# Patient Record
Sex: FEMALE | Race: WHITE | Employment: FULL TIME | URBAN - NONMETROPOLITAN AREA
[De-identification: names, ages, dates, MRNs, and addresses within clinical notes are randomized per-mention and may not be internally consistent; named-entity substitution may affect disease eponyms.]

---

## 2017-12-13 RX ORDER — ACETAMINOPHEN 325 MG/1
650 TABLET ORAL EVERY 4 HOURS PRN
Status: CANCELLED | OUTPATIENT
Start: 2017-12-13

## 2017-12-13 RX ORDER — SODIUM CHLORIDE 0.9 % (FLUSH) 0.9 %
10 SYRINGE (ML) INJECTION EVERY 12 HOURS SCHEDULED
Status: CANCELLED | OUTPATIENT
Start: 2017-12-13

## 2017-12-13 RX ORDER — TERBUTALINE SULFATE 1 MG/ML
0.25 INJECTION, SOLUTION SUBCUTANEOUS ONCE
Status: CANCELLED | OUTPATIENT
Start: 2017-12-13 | End: 2017-12-13

## 2017-12-13 RX ORDER — SODIUM CHLORIDE, SODIUM LACTATE, POTASSIUM CHLORIDE, CALCIUM CHLORIDE 600; 310; 30; 20 MG/100ML; MG/100ML; MG/100ML; MG/100ML
INJECTION, SOLUTION INTRAVENOUS CONTINUOUS
Status: CANCELLED | OUTPATIENT
Start: 2017-12-13

## 2017-12-13 RX ORDER — CARBOPROST TROMETHAMINE 250 UG/ML
250 INJECTION, SOLUTION INTRAMUSCULAR PRN
Status: CANCELLED | OUTPATIENT
Start: 2017-12-13

## 2017-12-13 RX ORDER — MORPHINE SULFATE 4 MG/ML
4 INJECTION, SOLUTION INTRAMUSCULAR; INTRAVENOUS
Status: CANCELLED | OUTPATIENT
Start: 2017-12-13

## 2017-12-13 RX ORDER — DIPHENHYDRAMINE HYDROCHLORIDE 50 MG/ML
25 INJECTION INTRAMUSCULAR; INTRAVENOUS EVERY 4 HOURS PRN
Status: CANCELLED | OUTPATIENT
Start: 2017-12-13

## 2017-12-13 RX ORDER — MORPHINE SULFATE 2 MG/ML
2 INJECTION, SOLUTION INTRAMUSCULAR; INTRAVENOUS
Status: CANCELLED | OUTPATIENT
Start: 2017-12-13

## 2017-12-13 RX ORDER — SODIUM CHLORIDE 0.9 % (FLUSH) 0.9 %
10 SYRINGE (ML) INJECTION PRN
Status: CANCELLED | OUTPATIENT
Start: 2017-12-13

## 2017-12-13 RX ORDER — IBUPROFEN 800 MG/1
800 TABLET ORAL EVERY 8 HOURS PRN
Status: CANCELLED | OUTPATIENT
Start: 2017-12-13

## 2017-12-13 RX ORDER — BUTORPHANOL TARTRATE 1 MG/ML
1 INJECTION, SOLUTION INTRAMUSCULAR; INTRAVENOUS
Status: CANCELLED | OUTPATIENT
Start: 2017-12-13

## 2017-12-13 RX ORDER — LIDOCAINE HYDROCHLORIDE 10 MG/ML
30 INJECTION, SOLUTION EPIDURAL; INFILTRATION; INTRACAUDAL; PERINEURAL PRN
Status: CANCELLED | OUTPATIENT
Start: 2017-12-13

## 2017-12-13 RX ORDER — ONDANSETRON 2 MG/ML
8 INJECTION INTRAMUSCULAR; INTRAVENOUS EVERY 6 HOURS PRN
Status: CANCELLED | OUTPATIENT
Start: 2017-12-13

## 2017-12-13 RX ORDER — METHYLERGONOVINE MALEATE 0.2 MG/ML
200 INJECTION INTRAVENOUS PRN
Status: CANCELLED | OUTPATIENT
Start: 2017-12-13

## 2017-12-14 ENCOUNTER — HOSPITAL ENCOUNTER (INPATIENT)
Age: 32
LOS: 2 days | Discharge: HOME | End: 2017-12-16
Attending: OBSTETRICS & GYNECOLOGY | Admitting: OBSTETRICS & GYNECOLOGY
Payer: COMMERCIAL

## 2017-12-14 ENCOUNTER — ANESTHESIA EVENT (OUTPATIENT)
Dept: LABOR AND DELIVERY | Age: 32
End: 2017-12-14
Payer: COMMERCIAL

## 2017-12-14 ENCOUNTER — ANESTHESIA (OUTPATIENT)
Dept: LABOR AND DELIVERY | Age: 32
End: 2017-12-14
Payer: COMMERCIAL

## 2017-12-14 LAB
ABO: NORMAL
AMPHETAMINE+METHAMPHETAMINE URINE SCREEN: NEGATIVE
ANISOCYTOSIS: ABNORMAL
ANTIBODY SCREEN: NORMAL
BARBITURATE QUANTITATIVE URINE: NEGATIVE
BASOPHILS # BLD: 0.5 %
BASOPHILS ABSOLUTE: 0.1 THOU/MM3 (ref 0–0.1)
BENZODIAZEPINE QUANTITATIVE URINE: NEGATIVE
CANNABINOID QUANTITATIVE URINE: NEGATIVE
COCAINE METABOLITE QUANTITATIVE URINE: NEGATIVE
EOSINOPHIL # BLD: 1.2 %
EOSINOPHILS ABSOLUTE: 0.1 THOU/MM3 (ref 0–0.4)
HCT VFR BLD CALC: 35.5 % (ref 37–47)
HEMOGLOBIN: 12.4 GM/DL (ref 12–16)
LYMPHOCYTES # BLD: 20.7 %
LYMPHOCYTES ABSOLUTE: 2.2 THOU/MM3 (ref 1–4.8)
MCH RBC QN AUTO: 32.1 PG (ref 27–31)
MCHC RBC AUTO-ENTMCNC: 34.8 GM/DL (ref 33–37)
MCV RBC AUTO: 92.1 FL (ref 81–99)
MONOCYTES # BLD: 5.5 %
MONOCYTES ABSOLUTE: 0.6 THOU/MM3 (ref 0.4–1.3)
NUCLEATED RED BLOOD CELLS: 0 /100 WBC
OPIATES, URINE: NEGATIVE
OXYCODONE: NEGATIVE
PDW BLD-RTO: 15.3 % (ref 11.5–14.5)
PHENCYCLIDINE QUANTITATIVE URINE: NEGATIVE
PLATELET # BLD: 150 THOU/MM3 (ref 130–400)
PMV BLD AUTO: 8.2 MCM (ref 7.4–10.4)
RBC # BLD: 3.86 MILL/MM3 (ref 4.2–5.4)
RH FACTOR: NORMAL
RPR: NONREACTIVE
SEG NEUTROPHILS: 72.1 %
SEGMENTED NEUTROPHILS ABSOLUTE COUNT: 7.6 THOU/MM3 (ref 1.8–7.7)
WBC # BLD: 10.5 THOU/MM3 (ref 4.8–10.8)

## 2017-12-14 PROCEDURE — 2500000003 HC RX 250 WO HCPCS: Performed by: NURSE ANESTHETIST, CERTIFIED REGISTERED

## 2017-12-14 PROCEDURE — 1220000001 HC SEMI PRIVATE L&D R&B

## 2017-12-14 PROCEDURE — 86901 BLOOD TYPING SEROLOGIC RH(D): CPT

## 2017-12-14 PROCEDURE — 36415 COLL VENOUS BLD VENIPUNCTURE: CPT

## 2017-12-14 PROCEDURE — A6258 TRANSPARENT FILM >16<=48 IN: HCPCS

## 2017-12-14 PROCEDURE — 3700000025 ANESTHESIA EPIDURAL BLOCK: Performed by: ANESTHESIOLOGY

## 2017-12-14 PROCEDURE — 85025 COMPLETE CBC W/AUTO DIFF WBC: CPT

## 2017-12-14 PROCEDURE — 2580000003 HC RX 258: Performed by: OBSTETRICS & GYNECOLOGY

## 2017-12-14 PROCEDURE — C1758 CATHETER, URETERAL: HCPCS

## 2017-12-14 PROCEDURE — 10907ZC DRAINAGE OF AMNIOTIC FLUID, THERAPEUTIC FROM PRODUCTS OF CONCEPTION, VIA NATURAL OR ARTIFICIAL OPENING: ICD-10-PCS | Performed by: OBSTETRICS & GYNECOLOGY

## 2017-12-14 PROCEDURE — 2500000003 HC RX 250 WO HCPCS: Performed by: ANESTHESIOLOGY

## 2017-12-14 PROCEDURE — 3E033VJ INTRODUCTION OF OTHER HORMONE INTO PERIPHERAL VEIN, PERCUTANEOUS APPROACH: ICD-10-PCS | Performed by: OBSTETRICS & GYNECOLOGY

## 2017-12-14 PROCEDURE — 86850 RBC ANTIBODY SCREEN: CPT

## 2017-12-14 PROCEDURE — 86592 SYPHILIS TEST NON-TREP QUAL: CPT

## 2017-12-14 PROCEDURE — 6360000002 HC RX W HCPCS

## 2017-12-14 PROCEDURE — 6370000000 HC RX 637 (ALT 250 FOR IP): Performed by: OBSTETRICS & GYNECOLOGY

## 2017-12-14 PROCEDURE — 86900 BLOOD TYPING SEROLOGIC ABO: CPT

## 2017-12-14 PROCEDURE — 7200000001 HC VAGINAL DELIVERY

## 2017-12-14 PROCEDURE — 80305 DRUG TEST PRSMV DIR OPT OBS: CPT

## 2017-12-14 RX ORDER — SODIUM CHLORIDE, SODIUM LACTATE, POTASSIUM CHLORIDE, CALCIUM CHLORIDE 600; 310; 30; 20 MG/100ML; MG/100ML; MG/100ML; MG/100ML
INJECTION, SOLUTION INTRAVENOUS CONTINUOUS
Status: DISCONTINUED | OUTPATIENT
Start: 2017-12-14 | End: 2017-12-15

## 2017-12-14 RX ORDER — ACETAMINOPHEN 325 MG/1
650 TABLET ORAL EVERY 4 HOURS PRN
Status: DISCONTINUED | OUTPATIENT
Start: 2017-12-14 | End: 2017-12-15 | Stop reason: HOSPADM

## 2017-12-14 RX ORDER — TERBUTALINE SULFATE 1 MG/ML
0.25 INJECTION, SOLUTION SUBCUTANEOUS ONCE
Status: DISCONTINUED | OUTPATIENT
Start: 2017-12-14 | End: 2017-12-15 | Stop reason: HOSPADM

## 2017-12-14 RX ORDER — PENICILLIN G 3000000 [IU]/50ML
3 INJECTION, SOLUTION INTRAVENOUS EVERY 4 HOURS
Status: DISCONTINUED | OUTPATIENT
Start: 2017-12-14 | End: 2017-12-14

## 2017-12-14 RX ORDER — MORPHINE SULFATE 2 MG/ML
2 INJECTION, SOLUTION INTRAMUSCULAR; INTRAVENOUS
Status: DISCONTINUED | OUTPATIENT
Start: 2017-12-14 | End: 2017-12-15 | Stop reason: HOSPADM

## 2017-12-14 RX ORDER — DIPHENHYDRAMINE HYDROCHLORIDE 50 MG/ML
25 INJECTION INTRAMUSCULAR; INTRAVENOUS EVERY 4 HOURS PRN
Status: DISCONTINUED | OUTPATIENT
Start: 2017-12-14 | End: 2017-12-15 | Stop reason: HOSPADM

## 2017-12-14 RX ORDER — IBUPROFEN 800 MG/1
800 TABLET ORAL EVERY 8 HOURS PRN
Status: DISCONTINUED | OUTPATIENT
Start: 2017-12-14 | End: 2017-12-15 | Stop reason: HOSPADM

## 2017-12-14 RX ORDER — SODIUM CHLORIDE 0.9 % (FLUSH) 0.9 %
10 SYRINGE (ML) INJECTION EVERY 12 HOURS SCHEDULED
Status: DISCONTINUED | OUTPATIENT
Start: 2017-12-14 | End: 2017-12-15 | Stop reason: HOSPADM

## 2017-12-14 RX ORDER — ONDANSETRON 2 MG/ML
8 INJECTION INTRAMUSCULAR; INTRAVENOUS EVERY 6 HOURS PRN
Status: DISCONTINUED | OUTPATIENT
Start: 2017-12-14 | End: 2017-12-15 | Stop reason: HOSPADM

## 2017-12-14 RX ORDER — LIDOCAINE HYDROCHLORIDE 20 MG/ML
INJECTION, SOLUTION INFILTRATION; PERINEURAL PRN
Status: DISCONTINUED | OUTPATIENT
Start: 2017-12-14 | End: 2017-12-15 | Stop reason: SDUPTHER

## 2017-12-14 RX ORDER — PENICILLIN G 2000000 [IU]/50ML
2 INJECTION, SOLUTION INTRAVENOUS ONCE
Status: DISCONTINUED | OUTPATIENT
Start: 2017-12-14 | End: 2017-12-14

## 2017-12-14 RX ORDER — CARBOPROST TROMETHAMINE 250 UG/ML
250 INJECTION, SOLUTION INTRAMUSCULAR PRN
Status: DISCONTINUED | OUTPATIENT
Start: 2017-12-14 | End: 2017-12-15

## 2017-12-14 RX ORDER — LIDOCAINE HYDROCHLORIDE 10 MG/ML
30 INJECTION, SOLUTION EPIDURAL; INFILTRATION; INTRACAUDAL; PERINEURAL PRN
Status: DISCONTINUED | OUTPATIENT
Start: 2017-12-14 | End: 2017-12-15 | Stop reason: HOSPADM

## 2017-12-14 RX ORDER — MORPHINE SULFATE 4 MG/ML
4 INJECTION, SOLUTION INTRAMUSCULAR; INTRAVENOUS
Status: DISCONTINUED | OUTPATIENT
Start: 2017-12-14 | End: 2017-12-15 | Stop reason: HOSPADM

## 2017-12-14 RX ORDER — ROPIVACAINE HYDROCHLORIDE 2 MG/ML
INJECTION, SOLUTION EPIDURAL; INFILTRATION; PERINEURAL
Status: DISCONTINUED
Start: 2017-12-14 | End: 2017-12-15

## 2017-12-14 RX ORDER — ONDANSETRON 2 MG/ML
4 INJECTION INTRAMUSCULAR; INTRAVENOUS EVERY 6 HOURS PRN
Status: DISCONTINUED | OUTPATIENT
Start: 2017-12-14 | End: 2017-12-15 | Stop reason: HOSPADM

## 2017-12-14 RX ORDER — LEVOTHYROXINE SODIUM 0.15 MG/1
150 TABLET ORAL DAILY
COMMUNITY

## 2017-12-14 RX ORDER — METHYLERGONOVINE MALEATE 0.2 MG/ML
200 INJECTION INTRAVENOUS PRN
Status: DISCONTINUED | OUTPATIENT
Start: 2017-12-14 | End: 2017-12-15 | Stop reason: HOSPADM

## 2017-12-14 RX ORDER — SODIUM CHLORIDE 0.9 % (FLUSH) 0.9 %
10 SYRINGE (ML) INJECTION PRN
Status: DISCONTINUED | OUTPATIENT
Start: 2017-12-14 | End: 2017-12-15 | Stop reason: HOSPADM

## 2017-12-14 RX ORDER — NALOXONE HYDROCHLORIDE 0.4 MG/ML
0.4 INJECTION, SOLUTION INTRAMUSCULAR; INTRAVENOUS; SUBCUTANEOUS PRN
Status: DISCONTINUED | OUTPATIENT
Start: 2017-12-14 | End: 2017-12-15 | Stop reason: HOSPADM

## 2017-12-14 RX ORDER — BUTORPHANOL TARTRATE 1 MG/ML
1 INJECTION, SOLUTION INTRAMUSCULAR; INTRAVENOUS
Status: DISCONTINUED | OUTPATIENT
Start: 2017-12-14 | End: 2017-12-15 | Stop reason: HOSPADM

## 2017-12-14 RX ORDER — PENICILLIN G 3000000 [IU]/50ML
3 INJECTION, SOLUTION INTRAVENOUS ONCE
Status: DISCONTINUED | OUTPATIENT
Start: 2017-12-14 | End: 2017-12-14

## 2017-12-14 RX ADMIN — Medication 1 MILLI-UNITS/MIN: at 11:00

## 2017-12-14 RX ADMIN — Medication 25 MCG: at 06:46

## 2017-12-14 RX ADMIN — SODIUM CHLORIDE, POTASSIUM CHLORIDE, SODIUM LACTATE AND CALCIUM CHLORIDE: 600; 310; 30; 20 INJECTION, SOLUTION INTRAVENOUS at 12:49

## 2017-12-14 RX ADMIN — SODIUM CHLORIDE, POTASSIUM CHLORIDE, SODIUM LACTATE AND CALCIUM CHLORIDE: 600; 310; 30; 20 INJECTION, SOLUTION INTRAVENOUS at 05:40

## 2017-12-14 RX ADMIN — SODIUM CHLORIDE, POTASSIUM CHLORIDE, SODIUM LACTATE AND CALCIUM CHLORIDE: 600; 310; 30; 20 INJECTION, SOLUTION INTRAVENOUS at 15:01

## 2017-12-14 RX ADMIN — Medication 16 ML/HR: at 14:55

## 2017-12-14 RX ADMIN — SODIUM CHLORIDE, POTASSIUM CHLORIDE, SODIUM LACTATE AND CALCIUM CHLORIDE: 600; 310; 30; 20 INJECTION, SOLUTION INTRAVENOUS at 11:40

## 2017-12-14 RX ADMIN — SODIUM CHLORIDE, POTASSIUM CHLORIDE, SODIUM LACTATE AND CALCIUM CHLORIDE: 600; 310; 30; 20 INJECTION, SOLUTION INTRAVENOUS at 23:04

## 2017-12-14 RX ADMIN — LIDOCAINE HYDROCHLORIDE 10 ML: 20 INJECTION, SOLUTION INFILTRATION; PERINEURAL at 14:53

## 2017-12-14 NOTE — ANESTHESIA PROCEDURE NOTES
Epidural Block    Patient location during procedure: OB  Start time: 12/14/2017 2:40 PM  End time: 12/14/2017 3:00 PM  Reason for block: labor epidural  Staffing  Anesthesiologist: Rosy Figueroa  Resident/CRNA: Areli Grullon  Performed: resident/CRNA   Preanesthetic Checklist  Completed: patient identified, site marked, surgical consent, pre-op evaluation, timeout performed, IV checked, risks and benefits discussed, monitors and equipment checked, anesthesia consent given, oxygen available and patient being monitored  Epidural  Patient position: sitting  Prep: ChloraPrep  Patient monitoring: continuous pulse ox and frequent blood pressure checks  Approach: midline  Location: lumbar (1-5)  Injection technique: HODAN saline  Provider prep: mask  Needle  Needle type: Tuohy   Needle gauge: 18 G  Needle insertion depth: 8 cm  Catheter type: side hole  Catheter size: 19 G  Catheter at skin depth: 12 cm  Test dose: negative  Assessment  Hemodynamics: stable  Attempts: 1

## 2017-12-14 NOTE — FLOWSHEET NOTE
Patient arrived for scheduled induction, states that the baby is moving as usual and denies any leaking of fluids or contractions at this time, EFM applied.

## 2017-12-14 NOTE — H&P
Serena Elliott is a 27 y.o. female patient. No diagnosis found. Past Medical History:   Diagnosis Date    Complication of anesthesia     states low blood pressure sfter surgery    Infertility, female     Rh incompatibility     Thyroid disease     hypothroidism     OB History      Para Term  AB Living    2 1 1 0 0 1    SAB TAB Ectopic Molar Multiple Live Births    0 0 0   0 1        39w3d  Estimated Date of Delivery: 17  No Known Allergies  Active Problems:    * No active hospital problems. *    Blood pressure (!) 120/58, pulse 78, temperature 96.6 °F (35.9 °C), resp. rate 18, height 5' 7\" (1.702 m), weight 258 lb (117 kg), SpO2 98 %, unknown if currently breastfeeding. Maternal Medical History:   Reason for admission: 39 week IOL    Contractions: Onset was 1-2 hours ago. Frequency: irregular. Perceived severity is moderate. Fetal activity: Perceived fetal activity is normal.    Last perceived fetal movement was within the past hour. Prenatal complications: Infertility - IUI pregnancy    Prenatal Complications - Diabetes: none. Maternal Exam:   Uterine Assessment: Contraction strength is moderate. Contraction frequency is irregular. Abdomen: Patient reports no abdominal tenderness. Fetal presentation: vertex    Introitus: Normal vulva. Normal vagina. Pelvis: adequate for delivery. Cervix: Cervix evaluated by digital exam.    2/60/-2 AROM clear, FSE placed    Fetal Exam  Fetal Monitor Review: Mode: fetal scalp electrode. Baseline rate: 130. Variability: moderate (6-25 bpm). Pattern: accelerations present. Fetal State Assessment: Category I - tracings are normal.          Assessment:  Early latent labor. Membrane status: AROM.    Fetal well-being: normal.     Plan:  Admit to L&D  Epidrual when desired  Anticipate MAT Jules DO  2017

## 2017-12-14 NOTE — ANESTHESIA PRE PROCEDURE
Encounters:   12/14/17 113/61   12/27/15 115/57   08/16/15 125/78       NPO Status: Time of last liquid consumption: 0500                        Time of last solid consumption: 0400                        Date of last liquid consumption: 12/14/17                        Date of last solid food consumption: 12/14/17    BMI:   Wt Readings from Last 3 Encounters:   12/14/17 258 lb (117 kg)   12/27/15 215 lb (97.5 kg)   08/13/15 265 lb (120.2 kg)     Body mass index is 40.41 kg/m². CBC:   Lab Results   Component Value Date    WBC 10.5 12/14/2017    RBC 3.86 12/14/2017    HGB 12.4 12/14/2017    HCT 35.5 12/14/2017    MCV 92.1 12/14/2017    RDW 15.3 12/14/2017     12/14/2017       CMP:   Lab Results   Component Value Date     12/27/2015    K 4.2 12/27/2015     12/27/2015    CO2 22 12/27/2015    BUN 13 12/27/2015    CREATININE 0.9 12/27/2015    LABGLOM 72 12/27/2015    GLUCOSE 103 12/27/2015    PROT 7.5 12/27/2015    CALCIUM 9.4 12/27/2015    BILITOT 0.3 12/27/2015    ALKPHOS 77 12/27/2015    AST 19 12/27/2015    ALT 39 12/27/2015       POC Tests: No results for input(s): POCGLU, POCNA, POCK, POCCL, POCBUN, POCHEMO, POCHCT in the last 72 hours.     Coags: No results found for: PROTIME, INR, APTT    HCG (If Applicable):   Lab Results   Component Value Date    PREGSERUM NEGATIVE 12/27/2015        ABGs: No results found for: PHART, PO2ART, CDP8FUZ, FAB3ZAO, BEART, Q1XBFQIX     Type & Screen (If Applicable):  Lab Results   Component Value Date    LABRH NEG 12/14/2017       Anesthesia Evaluation  Patient summary reviewed and Nursing notes reviewed no history of anesthetic complications:   Airway: Mallampati: II  TM distance: >3 FB   Neck ROM: full  Mouth opening: > = 3 FB Dental: normal exam         Pulmonary:Negative Pulmonary ROS and normal exam                               Cardiovascular:Negative CV ROS                      Neuro/Psych:   Negative Neuro/Psych ROS              GI/Hepatic/Renal: Neg GI/Hepatic/Renal ROS            Endo/Other: Negative Endo/Other ROS                    Abdominal:           Vascular: negative vascular ROS. Anesthesia Plan      epidural     ASA 2             Anesthetic plan and risks discussed with patient and spouse. Plan discussed with attending.                   Dasha Ramirez CRNA   12/14/2017

## 2017-12-14 NOTE — FLOWSHEET NOTE
Pt. Awake . Denies any pain. Plan of care reviewed  With pt. And her . Pt. Denies any pain at this time. Both pt. And her  want to rest at this time. Lights dim so pt.  Can rest.

## 2017-12-15 LAB
ANISOCYTOSIS: ABNORMAL
BASOPHILS # BLD: 0.1 %
BASOPHILS ABSOLUTE: 0 THOU/MM3 (ref 0–0.1)
EOSINOPHIL # BLD: 0.1 %
EOSINOPHILS ABSOLUTE: 0 THOU/MM3 (ref 0–0.4)
HCT VFR BLD CALC: 33.6 % (ref 37–47)
HEMOGLOBIN: 11.5 GM/DL (ref 12–16)
LYMPHOCYTES # BLD: 7.2 %
LYMPHOCYTES ABSOLUTE: 1.1 THOU/MM3 (ref 1–4.8)
MCH RBC QN AUTO: 31.4 PG (ref 27–31)
MCHC RBC AUTO-ENTMCNC: 34.1 GM/DL (ref 33–37)
MCV RBC AUTO: 92 FL (ref 81–99)
MONOCYTES # BLD: 3.6 %
MONOCYTES ABSOLUTE: 0.6 THOU/MM3 (ref 0.4–1.3)
NUCLEATED RED BLOOD CELLS: 0 /100 WBC
PDW BLD-RTO: 15.1 % (ref 11.5–14.5)
PLATELET # BLD: 131 THOU/MM3 (ref 130–400)
PMV BLD AUTO: 8.2 MCM (ref 7.4–10.4)
RBC # BLD: 3.66 MILL/MM3 (ref 4.2–5.4)
SEG NEUTROPHILS: 89 %
SEGMENTED NEUTROPHILS ABSOLUTE COUNT: 13.6 THOU/MM3 (ref 1.8–7.7)
WBC # BLD: 15.3 THOU/MM3 (ref 4.8–10.8)

## 2017-12-15 PROCEDURE — 36415 COLL VENOUS BLD VENIPUNCTURE: CPT

## 2017-12-15 PROCEDURE — 85025 COMPLETE CBC W/AUTO DIFF WBC: CPT

## 2017-12-15 PROCEDURE — 6360000002 HC RX W HCPCS: Performed by: OBSTETRICS & GYNECOLOGY

## 2017-12-15 PROCEDURE — 1220000000 HC SEMI PRIVATE OB R&B

## 2017-12-15 PROCEDURE — 6370000000 HC RX 637 (ALT 250 FOR IP): Performed by: OBSTETRICS & GYNECOLOGY

## 2017-12-15 RX ORDER — SODIUM CHLORIDE 0.9 % (FLUSH) 0.9 %
10 SYRINGE (ML) INJECTION EVERY 12 HOURS SCHEDULED
Status: DISCONTINUED | OUTPATIENT
Start: 2017-12-15 | End: 2017-12-16 | Stop reason: HOSPADM

## 2017-12-15 RX ORDER — SODIUM CHLORIDE 0.9 % (FLUSH) 0.9 %
10 SYRINGE (ML) INJECTION PRN
Status: DISCONTINUED | OUTPATIENT
Start: 2017-12-15 | End: 2017-12-16 | Stop reason: HOSPADM

## 2017-12-15 RX ORDER — LANOLIN 100 %
OINTMENT (GRAM) TOPICAL PRN
Status: DISCONTINUED | OUTPATIENT
Start: 2017-12-15 | End: 2017-12-16 | Stop reason: HOSPADM

## 2017-12-15 RX ORDER — LEVOTHYROXINE SODIUM 0.15 MG/1
150 TABLET ORAL DAILY
Status: DISCONTINUED | OUTPATIENT
Start: 2017-12-15 | End: 2017-12-16 | Stop reason: HOSPADM

## 2017-12-15 RX ORDER — DOCUSATE SODIUM 100 MG/1
100 CAPSULE, LIQUID FILLED ORAL 2 TIMES DAILY PRN
Status: DISCONTINUED | OUTPATIENT
Start: 2017-12-15 | End: 2017-12-16 | Stop reason: HOSPADM

## 2017-12-15 RX ORDER — IBUPROFEN 800 MG/1
800 TABLET ORAL 3 TIMES DAILY
Status: DISCONTINUED | OUTPATIENT
Start: 2017-12-15 | End: 2017-12-16 | Stop reason: HOSPADM

## 2017-12-15 RX ORDER — HYDROCODONE BITARTRATE AND ACETAMINOPHEN 5; 325 MG/1; MG/1
2 TABLET ORAL EVERY 4 HOURS PRN
Status: DISCONTINUED | OUTPATIENT
Start: 2017-12-15 | End: 2017-12-16 | Stop reason: HOSPADM

## 2017-12-15 RX ORDER — ONDANSETRON 4 MG/1
8 TABLET, FILM COATED ORAL EVERY 8 HOURS PRN
Status: DISCONTINUED | OUTPATIENT
Start: 2017-12-15 | End: 2017-12-16 | Stop reason: HOSPADM

## 2017-12-15 RX ORDER — ACETAMINOPHEN 325 MG/1
650 TABLET ORAL EVERY 4 HOURS PRN
Status: DISCONTINUED | OUTPATIENT
Start: 2017-12-15 | End: 2017-12-16 | Stop reason: HOSPADM

## 2017-12-15 RX ORDER — FERROUS SULFATE 325(65) MG
325 TABLET ORAL
Status: DISCONTINUED | OUTPATIENT
Start: 2017-12-15 | End: 2017-12-16 | Stop reason: HOSPADM

## 2017-12-15 RX ORDER — MORPHINE SULFATE 2 MG/ML
2 INJECTION, SOLUTION INTRAMUSCULAR; INTRAVENOUS
Status: ACTIVE | OUTPATIENT
Start: 2017-12-15 | End: 2017-12-16

## 2017-12-15 RX ORDER — HYDROCODONE BITARTRATE AND ACETAMINOPHEN 5; 325 MG/1; MG/1
1 TABLET ORAL EVERY 4 HOURS PRN
Status: DISCONTINUED | OUTPATIENT
Start: 2017-12-15 | End: 2017-12-16 | Stop reason: HOSPADM

## 2017-12-15 RX ORDER — METHYLERGONOVINE MALEATE 0.2 MG/ML
200 INJECTION INTRAVENOUS SEE ADMIN INSTRUCTIONS
Status: DISCONTINUED | OUTPATIENT
Start: 2017-12-15 | End: 2017-12-16 | Stop reason: HOSPADM

## 2017-12-15 RX ORDER — CARBOPROST TROMETHAMINE 250 UG/ML
250 INJECTION, SOLUTION INTRAMUSCULAR
Status: ACTIVE | OUTPATIENT
Start: 2017-12-15 | End: 2017-12-15

## 2017-12-15 RX ORDER — SODIUM CHLORIDE, SODIUM LACTATE, POTASSIUM CHLORIDE, CALCIUM CHLORIDE 600; 310; 30; 20 MG/100ML; MG/100ML; MG/100ML; MG/100ML
INJECTION, SOLUTION INTRAVENOUS CONTINUOUS
Status: DISCONTINUED | OUTPATIENT
Start: 2017-12-15 | End: 2017-12-16 | Stop reason: HOSPADM

## 2017-12-15 RX ORDER — MORPHINE SULFATE 2 MG/ML
4 INJECTION, SOLUTION INTRAMUSCULAR; INTRAVENOUS
Status: ACTIVE | OUTPATIENT
Start: 2017-12-15 | End: 2017-12-16

## 2017-12-15 RX ORDER — ONDANSETRON 2 MG/ML
4 INJECTION INTRAMUSCULAR; INTRAVENOUS EVERY 6 HOURS PRN
Status: DISCONTINUED | OUTPATIENT
Start: 2017-12-15 | End: 2017-12-16 | Stop reason: HOSPADM

## 2017-12-15 RX ADMIN — DOCUSATE SODIUM 100 MG: 100 CAPSULE, LIQUID FILLED ORAL at 20:44

## 2017-12-15 RX ADMIN — IBUPROFEN 800 MG: 800 TABLET, FILM COATED ORAL at 10:49

## 2017-12-15 RX ADMIN — IBUPROFEN 800 MG: 800 TABLET, FILM COATED ORAL at 20:44

## 2017-12-15 RX ADMIN — ONDANSETRON 8 MG: 2 INJECTION INTRAMUSCULAR; INTRAVENOUS at 00:50

## 2017-12-15 RX ADMIN — LEVOTHYROXINE SODIUM 150 MCG: 150 TABLET ORAL at 06:58

## 2017-12-15 ASSESSMENT — PAIN SCALES - GENERAL
PAINLEVEL_OUTOF10: 0
PAINLEVEL_OUTOF10: 5
PAINLEVEL_OUTOF10: 4

## 2017-12-15 NOTE — L&D DELIVERY NOTE
odor:  None         Mother Delivery Information    Episiotomy:  None   Lacerations:  None   Repair Suture:  None   Surgical or Additional Est. Blood Loss (mL):  0 (View Only):  Edit in Flowsheets   Combined Est. Blood Loss (mL):  0            Poor, Baby Girl [facility] [[id number]]     Events of Labor     labor?:  No    steroids?:  None   Cervical ripening date/time:     Antibiotics received during labor?:  No   Rupture date/time: 17 1110   Rupture type:  Artificial=AROM   Fluid color:  Meconium   Meconium consistency: Thick   Fluid odor:  None   Induction:  Misoprostol, Oxytocin   Indications for induction:  Elective   Augmentation:  None             Print Group Title    Labor onset date/time: 17   Dilation complete date/time:   17 EST   Start pushin2017   Decision time (emergent ):            Anesthesia    Method:  Epidural             Assisted Delivery Details    Forceps attempted?:  No   Vacuum extractor attempted?:  No            Document Additional Attempt       Document Additional Attempt                       Whigham Presentation    Presentation:  Vertex   _:  Occiput   _:  Anterior          Whigham Information     Changing the 's delivery date/time could affect patient care.:     Delivery date/time:   17   Delivery type:  Vaginal, Spontaneous Delivery    Details:                Delivery Providers    Delivering clinician:  Zechariah Bryant DO   Other personnel:   Provider Role   Pepe Sharif RN Registered Nurse   Anderson Moura Registered Nurse   Alberta Nino RCP Respiratory Therapist (Night)                Cord    Vessels:  3 Vessels   Complications:  None   Delayed Cord Clamping?:  Yes   Cord Blood Disposition:  Lab   Gases Sent?:  No          Placenta    Date/time:  2017   Removal:  Spontaneous   Appearance:  Intact   Disposition:  Refrigerator          Delivery Resuscitation    Method:  Bulb

## 2017-12-15 NOTE — FLOWSHEET NOTE
Pt up to the bathroom voided quantity sufficient, pericare per pt clean pad put in place, gown changed, to wheelchair and taken to 5b23 in wheelchair in stable condition with  daughter in arms.   Report to PEGGY Espitia RN at [id number]

## 2017-12-15 NOTE — FLOWSHEET NOTE
Dr. Sam Marino on phone, updated that patient was 8cm with last VE. States to call when ready for delivery.

## 2017-12-15 NOTE — FLOWSHEET NOTE
Dr Misti Hewitt given update, last known VE was 4 cm, reactive fetal tracing, contractions every 1-3 minutes, pitocin at 12 MU.  No change in POC at this time

## 2017-12-16 VITALS
HEIGHT: 67 IN | BODY MASS INDEX: 40.49 KG/M2 | OXYGEN SATURATION: 98 % | SYSTOLIC BLOOD PRESSURE: 131 MMHG | DIASTOLIC BLOOD PRESSURE: 75 MMHG | WEIGHT: 258 LBS | HEART RATE: 71 BPM | TEMPERATURE: 98 F | RESPIRATION RATE: 14 BRPM

## 2017-12-16 PROCEDURE — 6370000000 HC RX 637 (ALT 250 FOR IP): Performed by: OBSTETRICS & GYNECOLOGY

## 2017-12-16 RX ADMIN — LEVOTHYROXINE SODIUM 150 MCG: 150 TABLET ORAL at 08:03

## 2017-12-16 RX ADMIN — IBUPROFEN 800 MG: 800 TABLET, FILM COATED ORAL at 08:02

## 2017-12-16 ASSESSMENT — PAIN SCALES - GENERAL
PAINLEVEL_OUTOF10: 4
PAINLEVEL_OUTOF10: 1

## 2017-12-16 ASSESSMENT — PAIN DESCRIPTION - PAIN TYPE: TYPE: ACUTE PAIN

## 2017-12-16 ASSESSMENT — PAIN DESCRIPTION - LOCATION: LOCATION: ABDOMEN

## 2017-12-16 ASSESSMENT — PAIN DESCRIPTION - DESCRIPTORS: DESCRIPTORS: CRAMPING;SORE

## 2017-12-16 NOTE — PLAN OF CARE
needed. PRN motrin given as needed. Pt instructed on use of sprays, tucks, ice and heat.           Comments: Care plan reviewed with patient. Patient verbalizes understanding of the plan of care and contributes to goal setting.

## 2018-01-14 ENCOUNTER — HOSPITAL ENCOUNTER (EMERGENCY)
Age: 33
Discharge: HOME | End: 2018-01-14
Payer: COMMERCIAL

## 2018-01-14 VITALS
TEMPERATURE: 98.1 F | OXYGEN SATURATION: 97 % | RESPIRATION RATE: 15 BRPM | DIASTOLIC BLOOD PRESSURE: 53 MMHG | BODY MASS INDEX: 35.24 KG/M2 | WEIGHT: 225 LBS | HEART RATE: 57 BPM | SYSTOLIC BLOOD PRESSURE: 95 MMHG

## 2018-01-14 DIAGNOSIS — R10.13 ABDOMINAL PAIN, EPIGASTRIC: Primary | ICD-10-CM

## 2018-01-14 LAB
ALBUMIN SERPL-MCNC: 4 G/DL (ref 3.5–5.1)
ALP BLD-CCNC: 79 U/L (ref 38–126)
ALT SERPL-CCNC: 38 U/L (ref 11–66)
ANION GAP SERPL CALCULATED.3IONS-SCNC: 13 MEQ/L (ref 8–16)
AST SERPL-CCNC: 22 U/L (ref 5–40)
BASOPHILS # BLD: 0.8 %
BASOPHILS ABSOLUTE: 0.1 THOU/MM3 (ref 0–0.1)
BILIRUB SERPL-MCNC: 0.3 MG/DL (ref 0.3–1.2)
BILIRUBIN DIRECT: < 0.2 MG/DL (ref 0–0.3)
BILIRUBIN URINE: NEGATIVE
BLOOD, URINE: NEGATIVE
BUN BLDV-MCNC: 13 MG/DL (ref 7–22)
CALCIUM SERPL-MCNC: 9.9 MG/DL (ref 8.5–10.5)
CHARACTER, URINE: CLEAR
CHLORIDE BLD-SCNC: 100 MEQ/L (ref 98–111)
CO2: 26 MEQ/L (ref 23–33)
COLOR: YELLOW
CREAT SERPL-MCNC: 0.9 MG/DL (ref 0.4–1.2)
EOSINOPHIL # BLD: 1.2 %
EOSINOPHILS ABSOLUTE: 0.1 THOU/MM3 (ref 0–0.4)
FLU A ANTIGEN: NEGATIVE
FLU B ANTIGEN: NEGATIVE
GFR SERPL CREATININE-BSD FRML MDRD: 71 ML/MIN/1.73M2
GLUCOSE BLD-MCNC: 126 MG/DL (ref 70–108)
GLUCOSE URINE: NEGATIVE MG/DL
HCT VFR BLD CALC: 39.3 % (ref 37–47)
HEMOGLOBIN: 13.7 GM/DL (ref 12–16)
KETONES, URINE: ABNORMAL
LEUKOCYTE ESTERASE, URINE: NEGATIVE
LIPASE: 22.7 U/L (ref 5.6–51.3)
LYMPHOCYTES # BLD: 17.7 %
LYMPHOCYTES ABSOLUTE: 1.2 THOU/MM3 (ref 1–4.8)
MCH RBC QN AUTO: 30.8 PG (ref 27–31)
MCHC RBC AUTO-ENTMCNC: 34.8 GM/DL (ref 33–37)
MCV RBC AUTO: 88.5 FL (ref 81–99)
MONOCYTES # BLD: 5.4 %
MONOCYTES ABSOLUTE: 0.4 THOU/MM3 (ref 0.4–1.3)
NITRITE, URINE: NEGATIVE
NUCLEATED RED BLOOD CELLS: 0 /100 WBC
OSMOLALITY CALCULATION: 279.2 MOSMOL/KG (ref 275–300)
PDW BLD-RTO: 14.1 % (ref 11.5–14.5)
PH UA: 5
PLATELET # BLD: 170 THOU/MM3 (ref 130–400)
PMV BLD AUTO: 7.6 MCM (ref 7.4–10.4)
POTASSIUM SERPL-SCNC: 4.4 MEQ/L (ref 3.5–5.2)
PREGNANCY, SERUM: NEGATIVE
PROTEIN UA: NEGATIVE
RBC # BLD: 4.44 MILL/MM3 (ref 4.2–5.4)
SEG NEUTROPHILS: 74.9 %
SEGMENTED NEUTROPHILS ABSOLUTE COUNT: 5.2 THOU/MM3 (ref 1.8–7.7)
SODIUM BLD-SCNC: 139 MEQ/L (ref 135–145)
SPECIFIC GRAVITY, URINE: >= 1.03 (ref 1–1.03)
TOTAL PROTEIN: 6.9 G/DL (ref 6.1–8)
UROBILINOGEN, URINE: 0.2 EU/DL
WBC # BLD: 7 THOU/MM3 (ref 4.8–10.8)

## 2018-01-14 PROCEDURE — 84703 CHORIONIC GONADOTROPIN ASSAY: CPT

## 2018-01-14 PROCEDURE — 81003 URINALYSIS AUTO W/O SCOPE: CPT

## 2018-01-14 PROCEDURE — 87804 INFLUENZA ASSAY W/OPTIC: CPT

## 2018-01-14 PROCEDURE — 83690 ASSAY OF LIPASE: CPT

## 2018-01-14 PROCEDURE — 6360000002 HC RX W HCPCS: Performed by: PHYSICIAN ASSISTANT

## 2018-01-14 PROCEDURE — 36415 COLL VENOUS BLD VENIPUNCTURE: CPT

## 2018-01-14 PROCEDURE — 99284 EMERGENCY DEPT VISIT MOD MDM: CPT

## 2018-01-14 PROCEDURE — 85025 COMPLETE CBC W/AUTO DIFF WBC: CPT

## 2018-01-14 PROCEDURE — 6370000000 HC RX 637 (ALT 250 FOR IP): Performed by: PHYSICIAN ASSISTANT

## 2018-01-14 PROCEDURE — 82248 BILIRUBIN DIRECT: CPT

## 2018-01-14 PROCEDURE — 80053 COMPREHEN METABOLIC PANEL: CPT

## 2018-01-14 RX ORDER — FAMOTIDINE 20 MG/1
20 TABLET, FILM COATED ORAL 2 TIMES DAILY
Qty: 60 TABLET | Refills: 0 | Status: ON HOLD | OUTPATIENT
Start: 2018-01-14 | End: 2018-06-20 | Stop reason: ALTCHOICE

## 2018-01-14 RX ORDER — ONDANSETRON 4 MG/1
4 TABLET, ORALLY DISINTEGRATING ORAL ONCE
Status: COMPLETED | OUTPATIENT
Start: 2018-01-14 | End: 2018-01-14

## 2018-01-14 RX ORDER — ONDANSETRON 4 MG/1
4 TABLET, ORALLY DISINTEGRATING ORAL EVERY 8 HOURS PRN
Qty: 20 TABLET | Refills: 0 | Status: SHIPPED | OUTPATIENT
Start: 2018-01-14 | End: 2018-06-04 | Stop reason: ALTCHOICE

## 2018-01-14 RX ADMIN — ONDANSETRON 4 MG: 4 TABLET, ORALLY DISINTEGRATING ORAL at 06:31

## 2018-01-14 RX ADMIN — Medication 30 ML: at 06:31

## 2018-01-14 ASSESSMENT — PAIN DESCRIPTION - PAIN TYPE
TYPE: ACUTE PAIN
TYPE: ACUTE PAIN

## 2018-01-14 ASSESSMENT — ENCOUNTER SYMPTOMS
EYE PAIN: 0
SHORTNESS OF BREATH: 0
COUGH: 0
DIARRHEA: 0
BACK PAIN: 0
RHINORRHEA: 0
ABDOMINAL PAIN: 1
WHEEZING: 0
SORE THROAT: 0
EYE DISCHARGE: 0
VOMITING: 1
NAUSEA: 0

## 2018-01-14 ASSESSMENT — PAIN SCALES - GENERAL
PAINLEVEL_OUTOF10: 3
PAINLEVEL_OUTOF10: 8

## 2018-01-14 ASSESSMENT — PAIN DESCRIPTION - LOCATION
LOCATION: ABDOMEN
LOCATION: ABDOMEN

## 2018-01-14 ASSESSMENT — PAIN DESCRIPTION - ORIENTATION
ORIENTATION: MID;UPPER
ORIENTATION: MID;UPPER

## 2018-01-14 ASSESSMENT — PAIN DESCRIPTION - DESCRIPTORS
DESCRIPTORS: DULL
DESCRIPTORS: PRESSURE

## 2018-01-14 ASSESSMENT — PAIN DESCRIPTION - FREQUENCY: FREQUENCY: CONTINUOUS

## 2018-01-14 NOTE — ED PROVIDER NOTES
[facility], 1/14/18 6:32 AM Scribing for and in the presence of Cherelle Cool PA-C. Signed by: Jorge Romeo, 01/14/18 10:12 AM    Provider:  I personally performed the services described in the documentation, reviewed and edited the documentation which was dictated to the scribe in my presence, and it accurately records my words and actions.     Cherelle Cool PA-C 1/14/18 10:12 AM        JADE Toledo  01/14/18 1013

## 2018-03-13 ENCOUNTER — HOSPITAL ENCOUNTER (EMERGENCY)
Age: 33
Discharge: HOME | End: 2018-03-13
Attending: EMERGENCY MEDICINE

## 2018-03-13 ENCOUNTER — APPOINTMENT (OUTPATIENT)
Dept: GENERAL RADIOLOGY | Age: 33
End: 2018-03-13

## 2018-03-13 VITALS
SYSTOLIC BLOOD PRESSURE: 111 MMHG | OXYGEN SATURATION: 98 % | TEMPERATURE: 97.5 F | DIASTOLIC BLOOD PRESSURE: 82 MMHG | HEART RATE: 62 BPM | RESPIRATION RATE: 18 BRPM

## 2018-03-13 DIAGNOSIS — K29.00 ACUTE GASTRITIS WITHOUT HEMORRHAGE, UNSPECIFIED GASTRITIS TYPE: Primary | ICD-10-CM

## 2018-03-13 DIAGNOSIS — R10.13 ABDOMINAL PAIN, EPIGASTRIC: ICD-10-CM

## 2018-03-13 LAB
ALBUMIN SERPL-MCNC: 4.3 G/DL (ref 3.5–5.1)
ALP BLD-CCNC: 83 U/L (ref 38–126)
ALT SERPL-CCNC: 76 U/L (ref 11–66)
ANION GAP SERPL CALCULATED.3IONS-SCNC: 15 MEQ/L (ref 8–16)
AST SERPL-CCNC: 49 U/L (ref 5–40)
BACTERIA: ABNORMAL
BASOPHILS # BLD: 0.8 %
BASOPHILS ABSOLUTE: 0.1 THOU/MM3 (ref 0–0.1)
BILIRUB SERPL-MCNC: 0.5 MG/DL (ref 0.3–1.2)
BILIRUBIN DIRECT: < 0.2 MG/DL (ref 0–0.3)
BILIRUBIN URINE: NEGATIVE
BLOOD, URINE: ABNORMAL
BUN BLDV-MCNC: 11 MG/DL (ref 7–22)
CALCIUM SERPL-MCNC: 10.1 MG/DL (ref 8.5–10.5)
CASTS: ABNORMAL /LPF
CASTS: ABNORMAL /LPF
CHARACTER, URINE: CLEAR
CHLORIDE BLD-SCNC: 103 MEQ/L (ref 98–111)
CO2: 25 MEQ/L (ref 23–33)
COLOR: YELLOW
CREAT SERPL-MCNC: 0.9 MG/DL (ref 0.4–1.2)
CRYSTALS: ABNORMAL
EKG ATRIAL RATE: 61 BPM
EKG P AXIS: 71 DEGREES
EKG P-R INTERVAL: 182 MS
EKG Q-T INTERVAL: 442 MS
EKG QRS DURATION: 78 MS
EKG QTC CALCULATION (BAZETT): 444 MS
EKG R AXIS: 38 DEGREES
EKG T AXIS: 10 DEGREES
EKG VENTRICULAR RATE: 61 BPM
EOSINOPHIL # BLD: 2.3 %
EOSINOPHILS ABSOLUTE: 0.2 THOU/MM3 (ref 0–0.4)
EPITHELIAL CELLS, UA: ABNORMAL /HPF
GFR SERPL CREATININE-BSD FRML MDRD: 71 ML/MIN/1.73M2
GLUCOSE BLD-MCNC: 134 MG/DL (ref 70–108)
GLUCOSE, URINE: NEGATIVE MG/DL
HCT VFR BLD CALC: 39.6 % (ref 37–47)
HEMOGLOBIN: 14.1 GM/DL (ref 12–16)
KETONES, URINE: NEGATIVE
LEUKOCYTE ESTERASE, URINE: ABNORMAL
LIPASE: 36.7 U/L (ref 5.6–51.3)
LYMPHOCYTES # BLD: 22.4 %
LYMPHOCYTES ABSOLUTE: 1.9 THOU/MM3 (ref 1–4.8)
MCH RBC QN AUTO: 31 PG (ref 27–31)
MCHC RBC AUTO-ENTMCNC: 35.6 GM/DL (ref 33–37)
MCV RBC AUTO: 87 FL (ref 81–99)
MISCELLANEOUS LAB TEST RESULT: ABNORMAL
MONOCYTES # BLD: 5.4 %
MONOCYTES ABSOLUTE: 0.5 THOU/MM3 (ref 0.4–1.3)
MUCUS: ABNORMAL
NITRITE, URINE: NEGATIVE
NUCLEATED RED BLOOD CELLS: 0 /100 WBC
OSMOLALITY CALCULATION: 286.4 MOSMOL/KG (ref 275–300)
PDW BLD-RTO: 14.5 % (ref 11.5–14.5)
PH UA: 7
PLATELET # BLD: 174 THOU/MM3 (ref 130–400)
PMV BLD AUTO: 8.2 FL (ref 7.4–10.4)
POTASSIUM SERPL-SCNC: 3.8 MEQ/L (ref 3.5–5.2)
PREGNANCY, URINE: NEGATIVE
PROTEIN UA: ABNORMAL MG/DL
RBC # BLD: 4.55 MILL/MM3 (ref 4.2–5.4)
RBC URINE: ABNORMAL /HPF
RENAL EPITHELIAL, UA: ABNORMAL
SEG NEUTROPHILS: 69.1 %
SEGMENTED NEUTROPHILS ABSOLUTE COUNT: 5.8 THOU/MM3 (ref 1.8–7.7)
SODIUM BLD-SCNC: 143 MEQ/L (ref 135–145)
SPECIFIC GRAVITY UA: 1.02 (ref 1–1.03)
TOTAL PROTEIN: 7.3 G/DL (ref 6.1–8)
UROBILINOGEN, URINE: 1 EU/DL
WBC # BLD: 8.4 THOU/MM3 (ref 4.8–10.8)
WBC UA: ABNORMAL /HPF
YEAST: ABNORMAL

## 2018-03-13 PROCEDURE — 81025 URINE PREGNANCY TEST: CPT

## 2018-03-13 PROCEDURE — 82248 BILIRUBIN DIRECT: CPT

## 2018-03-13 PROCEDURE — 81001 URINALYSIS AUTO W/SCOPE: CPT

## 2018-03-13 PROCEDURE — 80053 COMPREHEN METABOLIC PANEL: CPT

## 2018-03-13 PROCEDURE — 74022 RADEX COMPL AQT ABD SERIES: CPT

## 2018-03-13 PROCEDURE — 36415 COLL VENOUS BLD VENIPUNCTURE: CPT

## 2018-03-13 PROCEDURE — 99284 EMERGENCY DEPT VISIT MOD MDM: CPT

## 2018-03-13 PROCEDURE — 6370000000 HC RX 637 (ALT 250 FOR IP): Performed by: EMERGENCY MEDICINE

## 2018-03-13 PROCEDURE — 93005 ELECTROCARDIOGRAM TRACING: CPT | Performed by: EMERGENCY MEDICINE

## 2018-03-13 PROCEDURE — 85025 COMPLETE CBC W/AUTO DIFF WBC: CPT

## 2018-03-13 PROCEDURE — 83690 ASSAY OF LIPASE: CPT

## 2018-03-13 RX ORDER — ONDANSETRON 4 MG/1
4 TABLET, ORALLY DISINTEGRATING ORAL EVERY 8 HOURS PRN
Qty: 10 TABLET | Refills: 0 | Status: SHIPPED | OUTPATIENT
Start: 2018-03-13 | End: 2018-06-04 | Stop reason: ALTCHOICE

## 2018-03-13 RX ORDER — FAMOTIDINE 20 MG/1
20 TABLET, FILM COATED ORAL 2 TIMES DAILY
Qty: 20 TABLET | Refills: 0 | Status: SHIPPED | OUTPATIENT
Start: 2018-03-13 | End: 2018-06-04 | Stop reason: ALTCHOICE

## 2018-03-13 RX ADMIN — Medication 30 ML: at 17:47

## 2018-03-13 ASSESSMENT — PAIN DESCRIPTION - PAIN TYPE: TYPE: ACUTE PAIN

## 2018-03-13 ASSESSMENT — PAIN DESCRIPTION - LOCATION: LOCATION: ABDOMEN

## 2018-03-13 ASSESSMENT — ENCOUNTER SYMPTOMS
DIARRHEA: 0
BACK PAIN: 0
ABDOMINAL PAIN: 1
RHINORRHEA: 0
SORE THROAT: 0
EYE PAIN: 0
COUGH: 0
EYE DISCHARGE: 0
NAUSEA: 0
SHORTNESS OF BREATH: 1
VOMITING: 0
BLOOD IN STOOL: 0
WHEEZING: 0

## 2018-03-13 ASSESSMENT — PAIN DESCRIPTION - DESCRIPTORS: DESCRIPTORS: PRESSURE

## 2018-03-13 ASSESSMENT — PAIN DESCRIPTION - ORIENTATION: ORIENTATION: MID;UPPER

## 2018-03-13 ASSESSMENT — PAIN SCALES - GENERAL: PAINLEVEL_OUTOF10: 8

## 2018-03-13 NOTE — ED PROVIDER NOTES
pain, joint swelling and neck pain. Skin: Negative for pallor and rash. Neurological: Negative for dizziness, syncope, weakness, light-headedness and headaches. Hematological: Negative for adenopathy. Psychiatric/Behavioral: Negative for confusion, dysphoric mood and suicidal ideas. The patient is not nervous/anxious. PHYSICAL EXAM   (up to 7 for level 4, 8 or more for level 5)      INITIAL VITALS:   BP (!) 114/57   Pulse 59   Temp 97.5 °F (36.4 °C) (Oral)   Resp 20   LMP 03/09/2018   SpO2 96%     Physical Exam   Constitutional: She is oriented to person, place, and time. She appears well-developed and well-nourished. HENT:   Head: Normocephalic and atraumatic. Right Ear: External ear normal.   Left Ear: External ear normal.   Eyes: Conjunctivae are normal. Right eye exhibits no discharge. Left eye exhibits no discharge. No scleral icterus. Neck: Normal range of motion. Neck supple. No JVD present. Cardiovascular: Normal rate, regular rhythm and normal heart sounds. Exam reveals no gallop and no friction rub. No murmur heard. Pulmonary/Chest: Effort normal. No stridor. No respiratory distress. She has no wheezes. She has no rales. Abdominal: Soft. She exhibits no distension and no mass. There is tenderness in the epigastric area. There is no rigidity, no rebound, no guarding and no CVA tenderness. Musculoskeletal: Normal range of motion. She exhibits no edema. Neurological: She is alert and oriented to person, place, and time. She exhibits normal muscle tone. GCS eye subscore is 4. GCS verbal subscore is 5. GCS motor subscore is 6. Skin: Skin is warm and dry. She is not diaphoretic. No erythema. Psychiatric: She has a normal mood and affect. Her behavior is normal.   Nursing note and vitals reviewed.       DIFFERENTIAL  DIAGNOSIS     PLAN (LABS / IMAGING / EKG):  Orders Placed This Encounter   Procedures    XR Acute Abd Series Chest 1 VW    Basic Metabolic Panel    CBC Jorge, 03/13/18 6:45 PM    Provider:  I personally performed the services described in the documentation, reviewed and edited the documentation which was dictated to the scribe in my presence, and it accurately records my words and actions. Bonny Clemens MD 3/13/18 6:45 PM        Bonny Clemens MD  Emergency Medicine Resident    (Please note that portions of this note were completed with a voice recognition program.  Efforts were made to edit the dictations but occasionally words are mis-transcribed.       Bonny Clemens MD  Resident  03/13/18 [id number]

## 2018-05-25 ENCOUNTER — HOSPITAL ENCOUNTER (OUTPATIENT)
Dept: ULTRASOUND IMAGING | Age: 33
Discharge: HOME | End: 2018-05-25
Payer: COMMERCIAL

## 2018-05-25 ENCOUNTER — HOSPITAL ENCOUNTER (OUTPATIENT)
Dept: GENERAL RADIOLOGY | Age: 33
Discharge: HOME | End: 2018-05-25
Payer: COMMERCIAL

## 2018-05-25 DIAGNOSIS — R10.13 EPIGASTRIC ABDOMINAL PAIN: ICD-10-CM

## 2018-05-25 DIAGNOSIS — R10.13 ABDOMINAL PAIN, EPIGASTRIC: ICD-10-CM

## 2018-05-25 PROCEDURE — 76705 ECHO EXAM OF ABDOMEN: CPT

## 2018-05-25 PROCEDURE — 6360000004 HC RX CONTRAST MEDICATION: Performed by: FAMILY MEDICINE

## 2018-05-25 PROCEDURE — 6370000000 HC RX 637 (ALT 250 FOR IP): Performed by: FAMILY MEDICINE

## 2018-05-25 PROCEDURE — A4641 RADIOPHARM DX AGENT NOC: HCPCS | Performed by: FAMILY MEDICINE

## 2018-05-25 PROCEDURE — 2500000003 HC RX 250 WO HCPCS: Performed by: FAMILY MEDICINE

## 2018-05-25 PROCEDURE — 74246 X-RAY XM UPR GI TRC 2CNTRST: CPT

## 2018-05-25 RX ADMIN — ANTACID/ANTIFLATULENT 1 EACH: 380; 550; 10; 10 GRANULE, EFFERVESCENT ORAL at 08:20

## 2018-05-25 RX ADMIN — BARIUM SULFATE 140 ML: 980 POWDER, FOR SUSPENSION ORAL at 08:20

## 2018-05-25 RX ADMIN — BARIUM SULFATE 60 ML: 0.6 SUSPENSION ORAL at 08:20

## 2018-06-04 ENCOUNTER — OFFICE VISIT (OUTPATIENT)
Dept: SURGERY | Age: 33
End: 2018-06-04
Payer: COMMERCIAL

## 2018-06-04 VITALS
RESPIRATION RATE: 18 BRPM | HEIGHT: 67 IN | OXYGEN SATURATION: 97 % | TEMPERATURE: 97.8 F | HEART RATE: 80 BPM | SYSTOLIC BLOOD PRESSURE: 122 MMHG | BODY MASS INDEX: 36.49 KG/M2 | DIASTOLIC BLOOD PRESSURE: 52 MMHG | WEIGHT: 232.5 LBS

## 2018-06-04 DIAGNOSIS — K80.20 CALCULUS OF GALLBLADDER WITHOUT CHOLECYSTITIS WITHOUT OBSTRUCTION: Primary | ICD-10-CM

## 2018-06-04 PROCEDURE — 99242 OFF/OP CONSLTJ NEW/EST SF 20: CPT | Performed by: SURGERY

## 2018-06-05 ASSESSMENT — ENCOUNTER SYMPTOMS
COUGH: 0
ABDOMINAL PAIN: 1
TROUBLE SWALLOWING: 0
SORE THROAT: 0
CHEST TIGHTNESS: 0
NAUSEA: 1
RECTAL PAIN: 0
DIARRHEA: 0
EYE PAIN: 0
CONSTIPATION: 0
BACK PAIN: 1
VOMITING: 0
EYE REDNESS: 0
BLOOD IN STOOL: 0
SHORTNESS OF BREATH: 0

## 2018-06-07 ENCOUNTER — TELEPHONE (OUTPATIENT)
Dept: SURGERY | Age: 33
End: 2018-06-07

## 2018-06-16 LAB
ALBUMIN SERPL-MCNC: 4.4 G/DL (ref 3.5–5.2)
ALK PHOSPHATASE: 73 U/L (ref 30–101)
ALT SERPL-CCNC: 38 U/L (ref 5–40)
AST SERPL-CCNC: 24 U/L (ref 9–40)
BILIRUB SERPL-MCNC: 0.3 MG/DL
BILIRUBIN DIRECT: <0.2 MG/DL
TOTAL PROTEIN: 7.1 G/DL (ref 6.1–8.3)

## 2018-06-20 ENCOUNTER — ANESTHESIA EVENT (OUTPATIENT)
Dept: OPERATING ROOM | Age: 33
End: 2018-06-20
Payer: COMMERCIAL

## 2018-06-20 ENCOUNTER — ANESTHESIA (OUTPATIENT)
Dept: OPERATING ROOM | Age: 33
End: 2018-06-20
Payer: COMMERCIAL

## 2018-06-20 ENCOUNTER — HOSPITAL ENCOUNTER (OUTPATIENT)
Age: 33
Setting detail: OUTPATIENT SURGERY
Discharge: HOME | End: 2018-06-20
Attending: SURGERY | Admitting: SURGERY
Payer: COMMERCIAL

## 2018-06-20 VITALS
DIASTOLIC BLOOD PRESSURE: 54 MMHG | BODY MASS INDEX: 36.09 KG/M2 | SYSTOLIC BLOOD PRESSURE: 95 MMHG | TEMPERATURE: 97.6 F | OXYGEN SATURATION: 98 % | HEIGHT: 67 IN | HEART RATE: 76 BPM | RESPIRATION RATE: 16 BRPM | WEIGHT: 229.94 LBS

## 2018-06-20 VITALS
TEMPERATURE: 97.2 F | SYSTOLIC BLOOD PRESSURE: 83 MMHG | OXYGEN SATURATION: 100 % | RESPIRATION RATE: 3 BRPM | DIASTOLIC BLOOD PRESSURE: 38 MMHG

## 2018-06-20 DIAGNOSIS — K80.20 CALCULUS OF GALLBLADDER WITHOUT CHOLECYSTITIS WITHOUT OBSTRUCTION: Primary | ICD-10-CM

## 2018-06-20 LAB — PREGNANCY, URINE: NEGATIVE

## 2018-06-20 PROCEDURE — 88304 TISSUE EXAM BY PATHOLOGIST: CPT

## 2018-06-20 PROCEDURE — 7100000011 HC PHASE II RECOVERY - ADDTL 15 MIN: Performed by: SURGERY

## 2018-06-20 PROCEDURE — 6360000002 HC RX W HCPCS: Performed by: ANESTHESIOLOGY

## 2018-06-20 PROCEDURE — 7100000000 HC PACU RECOVERY - FIRST 15 MIN: Performed by: SURGERY

## 2018-06-20 PROCEDURE — 3700000000 HC ANESTHESIA ATTENDED CARE: Performed by: SURGERY

## 2018-06-20 PROCEDURE — 6360000004 HC RX CONTRAST MEDICATION: Performed by: SURGERY

## 2018-06-20 PROCEDURE — 6360000002 HC RX W HCPCS: Performed by: SURGERY

## 2018-06-20 PROCEDURE — 2780000010 HC IMPLANT OTHER: Performed by: SURGERY

## 2018-06-20 PROCEDURE — S2900 ROBOTIC SURGICAL SYSTEM: HCPCS | Performed by: SURGERY

## 2018-06-20 PROCEDURE — 2500000003 HC RX 250 WO HCPCS: Performed by: SURGERY

## 2018-06-20 PROCEDURE — 81025 URINE PREGNANCY TEST: CPT

## 2018-06-20 PROCEDURE — 2580000003 HC RX 258: Performed by: SURGERY

## 2018-06-20 PROCEDURE — 3700000001 HC ADD 15 MINUTES (ANESTHESIA): Performed by: SURGERY

## 2018-06-20 PROCEDURE — 7100000001 HC PACU RECOVERY - ADDTL 15 MIN: Performed by: SURGERY

## 2018-06-20 PROCEDURE — 47563 LAPARO CHOLECYSTECTOMY/GRAPH: CPT | Performed by: SURGERY

## 2018-06-20 PROCEDURE — 7100000010 HC PHASE II RECOVERY - FIRST 15 MIN: Performed by: SURGERY

## 2018-06-20 PROCEDURE — C1729 CATH, DRAINAGE: HCPCS | Performed by: SURGERY

## 2018-06-20 PROCEDURE — 3600000019 HC SURGERY ROBOT ADDTL 15MIN: Performed by: SURGERY

## 2018-06-20 PROCEDURE — C1894 INTRO/SHEATH, NON-LASER: HCPCS | Performed by: SURGERY

## 2018-06-20 PROCEDURE — 2500000003 HC RX 250 WO HCPCS: Performed by: ANESTHESIOLOGY

## 2018-06-20 PROCEDURE — 3600000009 HC SURGERY ROBOT BASE: Performed by: SURGERY

## 2018-06-20 RX ORDER — MEPERIDINE HYDROCHLORIDE 25 MG/ML
12.5 INJECTION INTRAMUSCULAR; INTRAVENOUS; SUBCUTANEOUS EVERY 5 MIN PRN
Status: DISCONTINUED | OUTPATIENT
Start: 2018-06-20 | End: 2018-06-20 | Stop reason: HOSPADM

## 2018-06-20 RX ORDER — HYDROCODONE BITARTRATE AND ACETAMINOPHEN 5; 325 MG/1; MG/1
1 TABLET ORAL EVERY 4 HOURS PRN
Qty: 42 TABLET | Refills: 0 | Status: SHIPPED | OUTPATIENT
Start: 2018-06-20 | End: 2018-06-27

## 2018-06-20 RX ORDER — SODIUM CHLORIDE 9 MG/ML
INJECTION, SOLUTION INTRAVENOUS CONTINUOUS
Status: DISCONTINUED | OUTPATIENT
Start: 2018-06-20 | End: 2018-06-20 | Stop reason: HOSPADM

## 2018-06-20 RX ORDER — PROMETHAZINE HYDROCHLORIDE 25 MG/ML
12.5 INJECTION, SOLUTION INTRAMUSCULAR; INTRAVENOUS
Status: COMPLETED | OUTPATIENT
Start: 2018-06-20 | End: 2018-06-20

## 2018-06-20 RX ORDER — SUCCINYLCHOLINE CHLORIDE 20 MG/ML
INJECTION INTRAMUSCULAR; INTRAVENOUS PRN
Status: DISCONTINUED | OUTPATIENT
Start: 2018-06-20 | End: 2018-06-20 | Stop reason: SDUPTHER

## 2018-06-20 RX ORDER — ONDANSETRON 2 MG/ML
INJECTION INTRAMUSCULAR; INTRAVENOUS PRN
Status: DISCONTINUED | OUTPATIENT
Start: 2018-06-20 | End: 2018-06-20 | Stop reason: SDUPTHER

## 2018-06-20 RX ORDER — BUPIVACAINE HYDROCHLORIDE AND EPINEPHRINE 5; 5 MG/ML; UG/ML
INJECTION, SOLUTION EPIDURAL; INTRACAUDAL; PERINEURAL PRN
Status: DISCONTINUED | OUTPATIENT
Start: 2018-06-20 | End: 2018-06-20 | Stop reason: HOSPADM

## 2018-06-20 RX ORDER — KETOROLAC TROMETHAMINE 30 MG/ML
30 INJECTION, SOLUTION INTRAMUSCULAR; INTRAVENOUS EVERY 6 HOURS
Status: DISCONTINUED | OUTPATIENT
Start: 2018-06-20 | End: 2018-06-20 | Stop reason: HOSPADM

## 2018-06-20 RX ORDER — HYDROCODONE BITARTRATE AND ACETAMINOPHEN 5; 325 MG/1; MG/1
2 TABLET ORAL EVERY 4 HOURS PRN
Status: DISCONTINUED | OUTPATIENT
Start: 2018-06-20 | End: 2018-06-20 | Stop reason: HOSPADM

## 2018-06-20 RX ORDER — FENTANYL CITRATE 50 UG/ML
25 INJECTION, SOLUTION INTRAMUSCULAR; INTRAVENOUS EVERY 5 MIN PRN
Status: DISCONTINUED | OUTPATIENT
Start: 2018-06-20 | End: 2018-06-20 | Stop reason: HOSPADM

## 2018-06-20 RX ORDER — SODIUM CHLORIDE 0.9 % (FLUSH) 0.9 %
10 SYRINGE (ML) INJECTION PRN
Status: DISCONTINUED | OUTPATIENT
Start: 2018-06-20 | End: 2018-06-20 | Stop reason: HOSPADM

## 2018-06-20 RX ORDER — HYDROCODONE BITARTRATE AND ACETAMINOPHEN 5; 325 MG/1; MG/1
1 TABLET ORAL EVERY 4 HOURS PRN
Status: DISCONTINUED | OUTPATIENT
Start: 2018-06-20 | End: 2018-06-20 | Stop reason: HOSPADM

## 2018-06-20 RX ORDER — DEXAMETHASONE SODIUM PHOSPHATE 4 MG/ML
INJECTION, SOLUTION INTRA-ARTICULAR; INTRALESIONAL; INTRAMUSCULAR; INTRAVENOUS; SOFT TISSUE PRN
Status: DISCONTINUED | OUTPATIENT
Start: 2018-06-20 | End: 2018-06-20 | Stop reason: SDUPTHER

## 2018-06-20 RX ORDER — ACETAMINOPHEN 325 MG/1
650 TABLET ORAL EVERY 4 HOURS PRN
Status: DISCONTINUED | OUTPATIENT
Start: 2018-06-20 | End: 2018-06-20 | Stop reason: HOSPADM

## 2018-06-20 RX ORDER — PROPOFOL 10 MG/ML
INJECTION, EMULSION INTRAVENOUS PRN
Status: DISCONTINUED | OUTPATIENT
Start: 2018-06-20 | End: 2018-06-20 | Stop reason: SDUPTHER

## 2018-06-20 RX ORDER — NEOSTIGMINE METHYLSULFATE 5 MG/5 ML
SYRINGE (ML) INTRAVENOUS PRN
Status: DISCONTINUED | OUTPATIENT
Start: 2018-06-20 | End: 2018-06-20 | Stop reason: SDUPTHER

## 2018-06-20 RX ORDER — ROCURONIUM BROMIDE 10 MG/ML
INJECTION, SOLUTION INTRAVENOUS PRN
Status: DISCONTINUED | OUTPATIENT
Start: 2018-06-20 | End: 2018-06-20 | Stop reason: SDUPTHER

## 2018-06-20 RX ORDER — INDOCYANINE GREEN AND WATER 25 MG
KIT INJECTION PRN
Status: DISCONTINUED | OUTPATIENT
Start: 2018-06-20 | End: 2018-06-20

## 2018-06-20 RX ORDER — ONDANSETRON 4 MG/1
4 TABLET, FILM COATED ORAL EVERY 8 HOURS PRN
Qty: 6 TABLET | Refills: 0 | Status: SHIPPED | OUTPATIENT
Start: 2018-06-20

## 2018-06-20 RX ORDER — FENTANYL CITRATE 50 UG/ML
50 INJECTION, SOLUTION INTRAMUSCULAR; INTRAVENOUS EVERY 5 MIN PRN
Status: DISCONTINUED | OUTPATIENT
Start: 2018-06-20 | End: 2018-06-20 | Stop reason: HOSPADM

## 2018-06-20 RX ORDER — SODIUM CHLORIDE 0.9 % (FLUSH) 0.9 %
10 SYRINGE (ML) INJECTION EVERY 12 HOURS SCHEDULED
Status: DISCONTINUED | OUTPATIENT
Start: 2018-06-20 | End: 2018-06-20 | Stop reason: HOSPADM

## 2018-06-20 RX ORDER — GLYCOPYRROLATE 1 MG/5 ML
SYRINGE (ML) INTRAVENOUS PRN
Status: DISCONTINUED | OUTPATIENT
Start: 2018-06-20 | End: 2018-06-20 | Stop reason: SDUPTHER

## 2018-06-20 RX ORDER — KETOROLAC TROMETHAMINE 30 MG/ML
INJECTION, SOLUTION INTRAMUSCULAR; INTRAVENOUS PRN
Status: DISCONTINUED | OUTPATIENT
Start: 2018-06-20 | End: 2018-06-20 | Stop reason: SDUPTHER

## 2018-06-20 RX ORDER — INDOCYANINE GREEN AND WATER 25 MG
KIT INJECTION PRN
Status: DISCONTINUED | OUTPATIENT
Start: 2018-06-20 | End: 2018-06-20 | Stop reason: SDUPTHER

## 2018-06-20 RX ORDER — ACETAMINOPHEN 650 MG/1
650 SUPPOSITORY RECTAL EVERY 4 HOURS PRN
Status: DISCONTINUED | OUTPATIENT
Start: 2018-06-20 | End: 2018-06-20 | Stop reason: HOSPADM

## 2018-06-20 RX ORDER — LABETALOL HYDROCHLORIDE 5 MG/ML
5 INJECTION, SOLUTION INTRAVENOUS EVERY 10 MIN PRN
Status: DISCONTINUED | OUTPATIENT
Start: 2018-06-20 | End: 2018-06-20 | Stop reason: HOSPADM

## 2018-06-20 RX ORDER — FENTANYL CITRATE 50 UG/ML
INJECTION, SOLUTION INTRAMUSCULAR; INTRAVENOUS PRN
Status: DISCONTINUED | OUTPATIENT
Start: 2018-06-20 | End: 2018-06-20 | Stop reason: SDUPTHER

## 2018-06-20 RX ORDER — ONDANSETRON 2 MG/ML
4 INJECTION INTRAMUSCULAR; INTRAVENOUS EVERY 6 HOURS PRN
Status: DISCONTINUED | OUTPATIENT
Start: 2018-06-20 | End: 2018-06-20 | Stop reason: HOSPADM

## 2018-06-20 RX ADMIN — FENTANYL CITRATE 50 MCG: 50 INJECTION INTRAMUSCULAR; INTRAVENOUS at 12:15

## 2018-06-20 RX ADMIN — KETOROLAC TROMETHAMINE 30 MG: 30 INJECTION, SOLUTION INTRAMUSCULAR; INTRAVENOUS at 12:06

## 2018-06-20 RX ADMIN — SODIUM CHLORIDE: 9 INJECTION, SOLUTION INTRAVENOUS at 11:06

## 2018-06-20 RX ADMIN — FENTANYL CITRATE 100 MCG: 50 INJECTION INTRAMUSCULAR; INTRAVENOUS at 11:08

## 2018-06-20 RX ADMIN — FENTANYL CITRATE 50 MCG: 50 INJECTION INTRAMUSCULAR; INTRAVENOUS at 11:25

## 2018-06-20 RX ADMIN — DEXAMETHASONE SODIUM PHOSPHATE 8 MG: 4 INJECTION, SOLUTION INTRAMUSCULAR; INTRAVENOUS at 11:21

## 2018-06-20 RX ADMIN — Medication 120 MG: at 11:08

## 2018-06-20 RX ADMIN — ONDANSETRON HYDROCHLORIDE 4 MG: 4 INJECTION, SOLUTION INTRAMUSCULAR; INTRAVENOUS at 11:21

## 2018-06-20 RX ADMIN — ROCURONIUM BROMIDE 10 MG: 10 INJECTION, SOLUTION INTRAVENOUS at 11:35

## 2018-06-20 RX ADMIN — CEFOXITIN 2 G: 2 INJECTION, POWDER, FOR SOLUTION INTRAVENOUS at 11:09

## 2018-06-20 RX ADMIN — PROMETHAZINE HYDROCHLORIDE 12.5 MG: 25 INJECTION INTRAMUSCULAR; INTRAVENOUS at 12:35

## 2018-06-20 RX ADMIN — FENTANYL CITRATE 50 MCG: 50 INJECTION INTRAMUSCULAR; INTRAVENOUS at 12:00

## 2018-06-20 RX ADMIN — Medication 0.6 MG: at 12:10

## 2018-06-20 RX ADMIN — INDOCYANINE GREEN AND WATER 3.5 MG: KIT at 11:21

## 2018-06-20 RX ADMIN — Medication 3 MG: at 12:10

## 2018-06-20 RX ADMIN — PROPOFOL 150 MG: 10 INJECTION, EMULSION INTRAVENOUS at 11:08

## 2018-06-20 RX ADMIN — SODIUM CHLORIDE: 9 INJECTION, SOLUTION INTRAVENOUS at 10:11

## 2018-06-20 RX ADMIN — SODIUM CHLORIDE: 9 INJECTION, SOLUTION INTRAVENOUS at 11:50

## 2018-06-20 RX ADMIN — ROCURONIUM BROMIDE 30 MG: 10 INJECTION, SOLUTION INTRAVENOUS at 11:15

## 2018-06-20 RX ADMIN — ROCURONIUM BROMIDE 10 MG: 10 INJECTION, SOLUTION INTRAVENOUS at 11:08

## 2018-06-20 ASSESSMENT — PULMONARY FUNCTION TESTS
PIF_VALUE: 17
PIF_VALUE: 23
PIF_VALUE: 20
PIF_VALUE: 16
PIF_VALUE: 26
PIF_VALUE: 22
PIF_VALUE: 2
PIF_VALUE: 0
PIF_VALUE: 21
PIF_VALUE: 22
PIF_VALUE: 18
PIF_VALUE: 23
PIF_VALUE: 19
PIF_VALUE: 22
PIF_VALUE: 3
PIF_VALUE: 21
PIF_VALUE: 24
PIF_VALUE: 16
PIF_VALUE: 22
PIF_VALUE: 24
PIF_VALUE: 19
PIF_VALUE: 24
PIF_VALUE: 24
PIF_VALUE: 6
PIF_VALUE: 21
PIF_VALUE: 17
PIF_VALUE: 23
PIF_VALUE: 19
PIF_VALUE: 17
PIF_VALUE: 23
PIF_VALUE: 22
PIF_VALUE: 13
PIF_VALUE: 22
PIF_VALUE: 22
PIF_VALUE: 12
PIF_VALUE: 24
PIF_VALUE: 23
PIF_VALUE: 19
PIF_VALUE: 16
PIF_VALUE: 16
PIF_VALUE: 19
PIF_VALUE: 13
PIF_VALUE: 24
PIF_VALUE: 19
PIF_VALUE: 24
PIF_VALUE: 1
PIF_VALUE: 29
PIF_VALUE: 24
PIF_VALUE: 21
PIF_VALUE: 16
PIF_VALUE: 23
PIF_VALUE: 24
PIF_VALUE: 16
PIF_VALUE: 25
PIF_VALUE: 23
PIF_VALUE: 24
PIF_VALUE: 22
PIF_VALUE: 24
PIF_VALUE: 17
PIF_VALUE: 23
PIF_VALUE: 20
PIF_VALUE: 23
PIF_VALUE: 25
PIF_VALUE: 23
PIF_VALUE: 9
PIF_VALUE: 23
PIF_VALUE: 13
PIF_VALUE: 16
PIF_VALUE: 6
PIF_VALUE: 12
PIF_VALUE: 23
PIF_VALUE: 22
PIF_VALUE: 20

## 2018-06-20 ASSESSMENT — PAIN DESCRIPTION - PAIN TYPE: TYPE: OTHER (COMMENT)

## 2018-06-20 ASSESSMENT — PAIN SCALES - GENERAL: PAINLEVEL_OUTOF10: 0

## 2018-06-21 ENCOUNTER — TELEPHONE (OUTPATIENT)
Dept: SURGERY | Age: 33
End: 2018-06-21

## 2018-06-25 ENCOUNTER — TELEPHONE (OUTPATIENT)
Dept: SURGERY | Age: 33
End: 2018-06-25

## 2018-07-09 ENCOUNTER — OFFICE VISIT (OUTPATIENT)
Dept: SURGERY | Age: 33
End: 2018-07-09

## 2018-07-09 VITALS
OXYGEN SATURATION: 98 % | SYSTOLIC BLOOD PRESSURE: 110 MMHG | HEART RATE: 102 BPM | HEIGHT: 67 IN | BODY MASS INDEX: 36 KG/M2 | RESPIRATION RATE: 18 BRPM | TEMPERATURE: 97.5 F | DIASTOLIC BLOOD PRESSURE: 66 MMHG | WEIGHT: 229.4 LBS

## 2018-07-09 DIAGNOSIS — Z98.890 POST-OPERATIVE STATE: ICD-10-CM

## 2018-07-09 DIAGNOSIS — K80.20 CALCULUS OF GALLBLADDER WITHOUT CHOLECYSTITIS WITHOUT OBSTRUCTION: Primary | ICD-10-CM

## 2018-07-09 PROCEDURE — 99024 POSTOP FOLLOW-UP VISIT: CPT | Performed by: SURGERY

## 2018-07-26 ENCOUNTER — TELEPHONE (OUTPATIENT)
Dept: SURGERY | Age: 33
End: 2018-07-26

## 2018-07-26 RX ORDER — SULFAMETHOXAZOLE AND TRIMETHOPRIM 800; 160 MG/1; MG/1
1 TABLET ORAL 2 TIMES DAILY
Qty: 14 TABLET | Refills: 0 | Status: SHIPPED | OUTPATIENT
Start: 2018-07-26 | End: 2018-08-02

## 2018-07-26 NOTE — TELEPHONE ENCOUNTER
Bactrim DS BID for 7 days. If worsens or no improvement then call for office appointment or go to ED.

## 2018-07-26 NOTE — TELEPHONE ENCOUNTER
Bactrim e-scripted to pharm. Pt notified and relayed information to pt. Pt voices understanding. No further needs.

## 2020-01-24 ENCOUNTER — HOSPITAL ENCOUNTER (OUTPATIENT)
Dept: ULTRASOUND IMAGING | Age: 35
Discharge: HOME | End: 2020-01-24
Payer: COMMERCIAL

## 2020-01-24 ENCOUNTER — APPOINTMENT (OUTPATIENT)
Dept: CT IMAGING | Age: 35
End: 2020-01-24
Payer: COMMERCIAL

## 2020-01-24 PROCEDURE — 76830 TRANSVAGINAL US NON-OB: CPT

## 2020-01-24 PROCEDURE — 76856 US EXAM PELVIC COMPLETE: CPT

## 2020-01-30 ENCOUNTER — HOSPITAL ENCOUNTER (OUTPATIENT)
Age: 35
Discharge: HOME | End: 2020-01-30
Payer: COMMERCIAL

## 2020-01-30 LAB
EKG ATRIAL RATE: 67 BPM
EKG P AXIS: 71 DEGREES
EKG P-R INTERVAL: 176 MS
EKG Q-T INTERVAL: 406 MS
EKG QRS DURATION: 78 MS
EKG QTC CALCULATION (BAZETT): 429 MS
EKG R AXIS: 54 DEGREES
EKG T AXIS: 52 DEGREES
EKG VENTRICULAR RATE: 67 BPM

## 2020-01-30 PROCEDURE — 93010 ELECTROCARDIOGRAM REPORT: CPT | Performed by: NUCLEAR MEDICINE

## 2020-01-30 PROCEDURE — 93005 ELECTROCARDIOGRAM TRACING: CPT | Performed by: OBSTETRICS & GYNECOLOGY

## 2022-04-07 ENCOUNTER — OFFICE VISIT MH/BH (OUTPATIENT)
Dept: BARIATRICS/WEIGHT MGMT | Age: 37
End: 2022-04-07
Payer: COMMERCIAL

## 2022-04-07 VITALS
HEIGHT: 67 IN | OXYGEN SATURATION: 98 % | DIASTOLIC BLOOD PRESSURE: 76 MMHG | BODY MASS INDEX: 35.96 KG/M2 | WEIGHT: 229.1 LBS | HEART RATE: 73 BPM | SYSTOLIC BLOOD PRESSURE: 118 MMHG

## 2022-04-07 DIAGNOSIS — E66.9 OBESITY (BMI 30-39.9): ICD-10-CM

## 2022-04-07 DIAGNOSIS — Z79.899 MEDICATION MANAGEMENT: Primary | ICD-10-CM

## 2022-04-07 PROCEDURE — 99213 OFFICE O/P EST LOW 20 MIN: CPT | Performed by: NURSE PRACTITIONER

## 2022-04-07 ASSESSMENT — PATIENT HEALTH QUESTIONNAIRE - PHQ9
SUM OF ALL RESPONSES TO PHQ9 QUESTIONS 1 & 2: 0
SUM OF ALL RESPONSES TO PHQ QUESTIONS 1-9: 0
1. LITTLE INTEREST OR PLEASURE IN DOING THINGS: 0
2. FEELING DOWN, DEPRESSED OR HOPELESS: 0
SUM OF ALL RESPONSES TO PHQ QUESTIONS 1-9: 0

## 2022-04-07 ASSESSMENT — ENCOUNTER SYMPTOMS
GASTROINTESTINAL NEGATIVE: 1
ALLERGIC/IMMUNOLOGIC NEGATIVE: 1
EYES NEGATIVE: 1
RESPIRATORY NEGATIVE: 1

## 2022-04-07 NOTE — PROGRESS NOTES
Chief Complaint   Patient presents with    New Patient     1ST MED  VISIT          SUBJECTIVE:    HPI: Patient is here with complaints of weight gain and inability to lose weight per self. Inquiring about weight loss medications to assist with their weight loss goal.    Obesity with a BMI of Body mass index is 36.42 kg/m². Jesse Gardiner Patient has failed to lose 5% of body weight for past 10 years years. Any history of glaucoma? denies    Any history of drug abuse? denies    Any history of CAD, uncontrolled HTN, arrhythmias, stroke, or CHF? ? denies    Any history of hyperthyroidism? Has hypothyroidism - controlled with medications    Any current or recent use of MAOIs? denies    Pregnant or breastfeeding? N/A    Current dietary measures: watches portions    Current exercise measures: stays active    I have reviewed the patient's(pertinent information to this visit) medical history, family history(scanned in  the [address] [place] [address] under \"patient questioner\"), social history and review of systems with the patient today in the office.           Past Surgical History:   Procedure Laterality Date    APPENDECTOMY      @ age 22years old   Ana Solum BUNIONECTOMY Right     great toe    MS LAP,CHOLECYSTECTOMY N/A 6/20/2018    ROBOTIC CHOLECYSTECTOMY, POSSIBLE CHOLANGIOGRAM, POSSIBLE OPEN performed by Pippa Huynh MD at [facility]       Past Medical History:   Diagnosis Date    Complication of anesthesia     states low blood pressure sfter surgery    Infertility, female     Rh incompatibility     Thyroid disease     hypothroidism       Family History   Problem Relation Age of Onset    Asthma Brother     Cancer Paternal Grandmother         lung mets to brain    Stroke Paternal Grandfather        Social History     Socioeconomic History    Marital status:      Spouse name: Not on file    Number of children: Not on file    Years of education: Not on file    Highest education level: Not on file   Occupational History    Not on file Tobacco Use    Smoking status: Former Smoker     Quit date: 2014     Years since quittin.3    Smokeless tobacco: Never Used   Substance and Sexual Activity    Alcohol use: No    Drug use: No    Sexual activity: Yes     Partners: Male   Other Topics Concern    Not on file   Social History Narrative    Not on file     Social Determinants of Health     Financial Resource Strain:     Difficulty of Paying Living Expenses: Not on file   Food Insecurity:     Worried About Running Out of Food in the Last Year: Not on file    Elizabeth of Food in the Last Year: Not on file   Transportation Needs:     Lack of Transportation (Medical): Not on file    Lack of Transportation (Non-Medical):  Not on file   Physical Activity:     Days of Exercise per Week: Not on file    Minutes of Exercise per Session: Not on file   Stress:     Feeling of Stress : Not on file   Social Connections:     Frequency of Communication with Friends and Family: Not on file    Frequency of Social Gatherings with Friends and Family: Not on file    Attends Zoroastrianism Services: Not on file    Active Member of [address] [place] [address] or Organizations: Not on file    Attends Club or Organization Meetings: Not on file    Marital Status: Not on file   Intimate Partner Violence:     Fear of Current or Ex-Partner: Not on file    Emotionally Abused: Not on file    Physically Abused: Not on file    Sexually Abused: Not on file   Housing Stability:     Unable to Pay for Housing in the Last Year: Not on file    Number of [place] in the Last Year: Not on file    Unstable Housing in the Last Year: Not on file       Current Outpatient Medications   Medication Sig Dispense Refill    ondansetron (ZOFRAN) 4 MG tablet Take 1 tablet by mouth every 8 hours as needed for Nausea or Vomiting 6 tablet 0    levothyroxine (SYNTHROID) 150 MCG tablet Take 150 mcg by mouth Daily      Magic Mouthwash (MIRACLE MOUTHWASH) Swish and spit 15 mLs as needed for Irritation (Patient not taking: Reported on 4/7/2022)       No current facility-administered medications for this visit. No Known Allergies    Review of Systems:         Review of Systems   Constitutional: Positive for fatigue. HENT: Negative. Eyes: Negative. Respiratory: Negative. Cardiovascular: Negative. Gastrointestinal: Negative. Endocrine:        Hypothyroidism   Genitourinary:        PCOS/ hysterectomy   Musculoskeletal: Negative. Skin: Negative. Allergic/Immunologic: Negative. Neurological: Negative. Hematological: Negative. Psychiatric/Behavioral: Negative. OBJECTIVE:  Physical Exam:    /76 (Site: Left Upper Arm, Position: Sitting, Cuff Size: Medium Adult)   Pulse 73   Ht 5' 6.5\" (1.689 m)   Wt 229 lb 1.6 oz (103.9 kg)   SpO2 98%   BMI 36.42 kg/m²      Physical Exam  Constitutional:       Appearance: Normal appearance. HENT:      Head: Normocephalic. Nose: Nose normal.      Mouth/Throat:      Pharynx: Oropharynx is clear. Eyes:      Conjunctiva/sclera: Conjunctivae normal.      Pupils: Pupils are equal, round, and reactive to light. Cardiovascular:      Rate and Rhythm: Normal rate. Pulses: Normal pulses. Pulmonary:      Effort: Pulmonary effort is normal.      Breath sounds: Normal breath sounds. Abdominal:      General: Bowel sounds are normal.      Palpations: Abdomen is soft. Musculoskeletal:         General: Normal range of motion. Cervical back: Normal range of motion. Skin:     General: Skin is warm and dry. Capillary Refill: Capillary refill takes less than 2 seconds. Neurological:      General: No focal deficit present. Mental Status: She is alert and oriented to person, place, and time.    Psychiatric:         Mood and Affect: Mood normal.         Behavior: Behavior normal.           ASSESSMENT:  1. Obesity (BMI 30-39.9)  - Start tracking calories; about 1200  daily  - 64 oz water daily  - Increase activity as able    2. Medication management  - No longer has bariatric benefits  - Recent CBC, CMP noted  - If UDS and EKG WNL, will start Adipex with next visit    - Urine Drug Screen; Future  - EKG 12 Lead; Future      PLAN:    -Will plan on starting Adipex with next visit if labs, UDS, and EKG WNL. BMI is over 30, or over 27 with weight-related risk factors.    -Pt aware Adipex can only be used short term (3 months). -Pt aware no breaks in treatment allowed or must be off for 6 months.    -Pt aware that weight must be lost at each visit or medication will be discontinued.     -Pt is aware that in order to be successful, must combine Adipex with appropriate diet and exercise plan. -Pt provided with medication handout that covers use; side effects (common side effects include insomnia, dry mouth, constipation, nervousness, increased heart rate, hypertension); precautions; and interactions.     -Pt aware must meet monthly in person while on this medication.     -Check EKG, UDS, CBC, and CMP prior to starting. If any abnormalities will need addressed prior to starting medication .    -Check [facility] Rx Reporting System. Any concerns: NONE Reported     - Pt aware pregnancy and breast feeding contraindicated while using this medication. Current birth control measures include: pt had hysterectomy    -If elderly or renal impairment, will use lower dose (15 mg daily) and avoid all together if GFR is less than 15. N/A       Orders Placed This Encounter   Procedures    Urine Drug Screen    EKG 12 Lead        No orders of the defined types were placed in this encounter. Follow Up:  Return in about 2 weeks (around 4/21/2022).       Anmol Sagastume, APRN - CNP

## 2022-04-15 ENCOUNTER — HOSPITAL ENCOUNTER (OUTPATIENT)
Age: 37
Discharge: HOME | End: 2022-04-15
Payer: COMMERCIAL

## 2022-04-15 DIAGNOSIS — Z79.899 MEDICATION MANAGEMENT: ICD-10-CM

## 2022-04-15 LAB
AMPHETAMINE+METHAMPHETAMINE URINE SCREEN: NEGATIVE
BARBITURATE QUANTITATIVE URINE: NEGATIVE
BENZODIAZEPINE QUANTITATIVE URINE: NEGATIVE
CANNABINOID QUANTITATIVE URINE: NEGATIVE
COCAINE METABOLITE QUANTITATIVE URINE: NEGATIVE
OPIATES, URINE: NEGATIVE
OXYCODONE: NEGATIVE
PHENCYCLIDINE QUANTITATIVE URINE: NEGATIVE

## 2022-04-15 PROCEDURE — 80307 DRUG TEST PRSMV CHEM ANLYZR: CPT

## 2022-04-15 PROCEDURE — 93005 ELECTROCARDIOGRAM TRACING: CPT

## 2022-04-17 LAB
EKG ATRIAL RATE: 57 BPM
EKG P AXIS: 58 DEGREES
EKG P-R INTERVAL: 160 MS
EKG Q-T INTERVAL: 378 MS
EKG QRS DURATION: 74 MS
EKG QTC CALCULATION (BAZETT): 367 MS
EKG R AXIS: 14 DEGREES
EKG T AXIS: 34 DEGREES
EKG VENTRICULAR RATE: 57 BPM

## 2022-04-17 PROCEDURE — 93010 ELECTROCARDIOGRAM REPORT: CPT | Performed by: INTERNAL MEDICINE

## 2022-04-18 ENCOUNTER — OFFICE VISIT (OUTPATIENT)
Dept: BARIATRICS/WEIGHT MGMT | Age: 37
End: 2022-04-18
Payer: COMMERCIAL

## 2022-04-18 ENCOUNTER — TELEPHONE (OUTPATIENT)
Dept: BARIATRICS/WEIGHT MGMT | Age: 37
End: 2022-04-18

## 2022-04-18 VITALS
HEART RATE: 76 BPM | SYSTOLIC BLOOD PRESSURE: 118 MMHG | HEIGHT: 67 IN | DIASTOLIC BLOOD PRESSURE: 80 MMHG | WEIGHT: 230.6 LBS | BODY MASS INDEX: 36.19 KG/M2

## 2022-04-18 DIAGNOSIS — Z79.899 MEDICATION MANAGEMENT: Primary | ICD-10-CM

## 2022-04-18 DIAGNOSIS — E66.9 OBESITY (BMI 30-39.9): ICD-10-CM

## 2022-04-18 PROCEDURE — 99213 OFFICE O/P EST LOW 20 MIN: CPT | Performed by: NURSE PRACTITIONER

## 2022-04-18 RX ORDER — PHENTERMINE HYDROCHLORIDE 37.5 MG/1
37.5 TABLET ORAL
Qty: 30 TABLET | Refills: 0 | Status: SHIPPED | OUTPATIENT
Start: 2022-04-18 | End: 2022-05-18

## 2022-04-18 ASSESSMENT — ENCOUNTER SYMPTOMS
EYES NEGATIVE: 1
RESPIRATORY NEGATIVE: 1
ALLERGIC/IMMUNOLOGIC NEGATIVE: 1
GASTROINTESTINAL NEGATIVE: 1

## 2022-04-18 NOTE — TELEPHONE ENCOUNTER
Per call to dionna to clarify insurance coverage. No bariatric coverage with this policy excluded.  Replaced by [facility]#[id number]

## 2022-04-18 NOTE — PROGRESS NOTES
Chief Complaint   Patient presents with    Weight Management     2nd med wm          SUBJECTIVE:    HPI: Patient is here with complaints of weight gain and inability to lose weight per self. Inquiring about weight loss medications to assist with their weight loss goal.    Obesity with a BMI of Body mass index is 36.66 kg/m². Greg Ra Patient has failed to lose 5% of body weight for past 10 years. Any history of glaucoma? denies    Any history of drug abuse? denies    Any history of CAD, uncontrolled HTN, arrhythmias, stroke, or CHF? ? denies    Any history of hyperthyroidism? Hypothyroidism controlled with medications    Any current or recent use of MAOIs? denies    Pregnant or breastfeeding? N/A    Current dietary measures: [employer] program / drinks gallon water a day    Current exercise measures: stays active    I have reviewed the patient's(pertinent information to this visit) medical history, family history(scanned in  the [address] [place] [address] under \"patient questioner\"), social history and review of systems with the patient today in the office.           Past Surgical History:   Procedure Laterality Date    APPENDECTOMY      @ age 22years old   [employer] BUNIONECTOMY Right     great toe    [place] LAP,CHOLECYSTECTOMY N/A 6/20/2018    ROBOTIC CHOLECYSTECTOMY, POSSIBLE CHOLANGIOGRAM, POSSIBLE OPEN performed by Elie Pickard MD at [facility]       Past Medical History:   Diagnosis Date    Complication of anesthesia     states low blood pressure sfter surgery    Infertility, female     Rh incompatibility     Thyroid disease     hypothroidism       Family History   Problem Relation Age of Onset    Asthma Brother     Cancer Paternal Grandmother         lung mets to brain    Stroke Paternal Grandfather        Social History     Socioeconomic History    Marital status:      Spouse name: Not on file    Number of children: Not on file    Years of education: Not on file    Highest education level: Not on file   Occupational History  Not on file   Tobacco Use    Smoking status: Former Smoker     Quit date: 2014     Years since quittin.3    Smokeless tobacco: Never Used   Substance and Sexual Activity    Alcohol use: No    Drug use: No    Sexual activity: Yes     Partners: Male   Other Topics Concern    Not on file   Social History Narrative    Not on file     Social Determinants of Health     Financial Resource Strain:     Difficulty of Paying Living Expenses: Not on file   Food Insecurity:     Worried About Running Out of Food in the Last Year: Not on file    Elizabeth of Food in the Last Year: Not on file   Transportation Needs:     Lack of Transportation (Medical): Not on file    Lack of Transportation (Non-Medical): Not on file   Physical Activity:     Days of Exercise per Week: Not on file    Minutes of Exercise per Session: Not on file   Stress:     Feeling of Stress : Not on file   Social Connections:     Frequency of Communication with Friends and Family: Not on file    Frequency of Social Gatherings with Friends and Family: Not on file    Attends Zoroastrian Services: Not on file    Active Member of [address] [place] [address] or Organizations: Not on file    Attends Club or Organization Meetings: Not on file    Marital Status: Not on file   Intimate Partner Violence:     Fear of Current or Ex-Partner: Not on file    Emotionally Abused: Not on file    Physically Abused: Not on file    Sexually Abused: Not on file   Housing Stability:     Unable to Pay for Housing in the Last Year: Not on file    Number of [place] in the Last Year: Not on file    Unstable Housing in the Last Year: Not on file       Current Outpatient Medications   Medication Sig Dispense Refill    phentermine (ADIPEX-P) 37.5 MG tablet Take 1 tablet by mouth every morning (before breakfast) for 30 days. 1/2 tab daily x3-5 days and then full tab.  BMI 30 tablet 0    ondansetron (ZOFRAN) 4 MG tablet Take 1 tablet by mouth every 8 hours as needed for Nausea or Vomiting 6 tablet 0    Magic Mouthwash (MIRACLE MOUTHWASH) Swish and spit 15 mLs as needed for Irritation (Patient not taking: Reported on 4/7/2022)      levothyroxine (SYNTHROID) 150 MCG tablet Take 150 mcg by mouth Daily       No current facility-administered medications for this visit. No Known Allergies    Review of Systems:         Review of Systems   Constitutional: Positive for fatigue. HENT: Negative. Eyes: Negative. Respiratory: Negative. Cardiovascular: Negative. Gastrointestinal: Negative. Endocrine:        Hypothyroidism   Genitourinary: Negative. Musculoskeletal: Negative. Skin: Negative. Allergic/Immunologic: Negative. Neurological: Negative. Hematological: Negative. Psychiatric/Behavioral: Negative. OBJECTIVE:  Physical Exam:    /80   Pulse 76   Ht 5' 6.5\" (1.689 m)   Wt 230 lb 9.6 oz (104.6 kg)   BMI 36.66 kg/m²      Physical Exam  Constitutional:       Appearance: Normal appearance. HENT:      Head: Normocephalic. Nose: Nose normal.      Mouth/Throat:      Pharynx: Oropharynx is clear. Eyes:      Conjunctiva/sclera: Conjunctivae normal.      Pupils: Pupils are equal, round, and reactive to light. Cardiovascular:      Rate and Rhythm: Normal rate. Pulses: Normal pulses. Pulmonary:      Effort: Pulmonary effort is normal.      Breath sounds: Normal breath sounds. Abdominal:      General: Bowel sounds are normal.      Palpations: Abdomen is soft. Musculoskeletal:         General: Normal range of motion. Cervical back: Normal range of motion. Skin:     General: Skin is warm and dry. Capillary Refill: Capillary refill takes less than 2 seconds. Neurological:      General: No focal deficit present. Mental Status: She is alert and oriented to person, place, and time.    Psychiatric:         Mood and Affect: Mood normal.         Behavior: Behavior normal.           ASSESSMENT:  1. Obesity (BMI 30-39. 9)  - Start tracking calories; about 1200  daily  - 64 oz water daily  - Increase activity as able    2. Medication management  - Meets criteria to start Adipex      PLAN:    - Recent Labs, UDS, and EKG reviewed and WNL    - Patient will start on Adipex in am and take as directed    - Call for adverse side effects or concerns    - BMI is over 30, or over 27 with weight-related risk factors. - Pt aware Adipex can only be used short term (3 months). - Pt aware no breaks in treatment allowed or must be off for 6 months. - Pt aware that weight must be lost at each visit or medication will be discontinued. - Pt is aware that in order to be successful, must combine Adipex with appropriate diet and exercise plan. - Pt provided with medication handout that covers use; side effects (common side effects include insomnia, dry mouth, constipation, nervousness, increased heart rate, hypertension);    precautions; and interactions. - Pt aware must meet monthly in person while on this medication.     - Check [place] Automated Rx Reporting System. Any concerns: NONE Reported     -  Current birth control measures include: hysterectomy    - If elderly or renal impairment, will use lower dose (15 mg daily) and avoid all together if GFR is less than 15. N/A     - RTC in one month    No orders of the defined types were placed in this encounter. Orders Placed This Encounter   Medications    phentermine (ADIPEX-P) 37.5 MG tablet     Sig: Take 1 tablet by mouth every morning (before breakfast) for 30 days. 1/2 tab daily x3-5 days and then full tab. BMI     Dispense:  30 tablet     Refill:  0        Follow Up:  Return in about 1 month (around 5/18/2022).       ERIC Mojica - CNP

## 2022-05-23 ENCOUNTER — OFFICE VISIT (OUTPATIENT)
Dept: BARIATRICS/WEIGHT MGMT | Age: 37
End: 2022-05-23
Payer: COMMERCIAL

## 2022-05-23 VITALS
OXYGEN SATURATION: 100 % | SYSTOLIC BLOOD PRESSURE: 120 MMHG | BODY MASS INDEX: 34.06 KG/M2 | WEIGHT: 217 LBS | HEIGHT: 67 IN | HEART RATE: 80 BPM | DIASTOLIC BLOOD PRESSURE: 80 MMHG

## 2022-05-23 DIAGNOSIS — E66.9 OBESITY (BMI 30-39.9): ICD-10-CM

## 2022-05-23 DIAGNOSIS — Z79.899 MEDICATION MANAGEMENT: Primary | ICD-10-CM

## 2022-05-23 PROCEDURE — 99213 OFFICE O/P EST LOW 20 MIN: CPT | Performed by: NURSE PRACTITIONER

## 2022-05-23 RX ORDER — PHENTERMINE HYDROCHLORIDE 37.5 MG/1
37.5 TABLET ORAL
Qty: 30 TABLET | Refills: 0 | Status: SHIPPED | OUTPATIENT
Start: 2022-05-23 | End: 2022-06-22

## 2022-05-23 ASSESSMENT — ENCOUNTER SYMPTOMS
RESPIRATORY NEGATIVE: 1
EYES NEGATIVE: 1
GASTROINTESTINAL NEGATIVE: 1
ALLERGIC/IMMUNOLOGIC NEGATIVE: 1

## 2022-05-23 NOTE — PROGRESS NOTES
Chief Complaint   Patient presents with    Weight Management     ADIPEX REFILL#2          SUBJECTIVE:    HPI: Patient is here with complaints of: Monthly Adipex visit. Obesity with a BMI of Body mass index is 34.5 kg/m². .    Current weight: 217    Weight change since last visit: -13.6 pounds/ -12.1 total (if pt failed to lose weight, cannot remain on medication). Month 2 of 3 of being on Adipex. Any new absolute contraindications (glaucoma, drug abuse, CAD, uncontrolled HTN, arrhythmias, stroke, CHF, hyperthyroidism, MAOI use, pregnant or breastfeeding) to being on medication: DENIES     Pt complains of the following side effects: DENIES    Current dietary measures: tracking calories; about 1000 daily    Current exercise measures: staying active    I have reviewed the patient's(pertinent information to this visit) medical history, family history(scanned in  the [address] [place] [address] under \"patient questioner\"), social history and review of systems with the patient today in the office.           Past Surgical History:   Procedure Laterality Date    APPENDECTOMY      @ age 22years old   [employer] BUNIONECTOMY Right     great toe    [place] LAP,CHOLECYSTECTOMY N/A 6/20/2018    ROBOTIC CHOLECYSTECTOMY, POSSIBLE CHOLANGIOGRAM, POSSIBLE OPEN performed by Timoteo Rizo MD at [facility]       Past Medical History:   Diagnosis Date    Complication of anesthesia     states low blood pressure sfter surgery    Infertility, female     Rh incompatibility     Thyroid disease     hypothroidism       Family History   Problem Relation Age of Onset    Asthma Brother     Cancer Paternal Grandmother         lung mets to brain    Stroke Paternal Grandfather        Social History     Socioeconomic History    Marital status:      Spouse name: Not on file    Number of children: Not on file    Years of education: Not on file    Highest education level: Not on file   Occupational History    Not on file   Tobacco Use    Smoking status: Former Smoker     Quit date: 2014     Years since quittin.4    Smokeless tobacco: Never Used   Substance and Sexual Activity    Alcohol use: No    Drug use: No    Sexual activity: Yes     Partners: Male   Other Topics Concern    Not on file   Social History Narrative    Not on file     Social Determinants of Health     Financial Resource Strain:     Difficulty of Paying Living Expenses: Not on file   Food Insecurity:     Worried About Running Out of Food in the Last Year: Not on file    Elizabeth of Food in the Last Year: Not on file   Transportation Needs:     Lack of Transportation (Medical): Not on file    Lack of Transportation (Non-Medical): Not on file   Physical Activity:     Days of Exercise per Week: Not on file    Minutes of Exercise per Session: Not on file   Stress:     Feeling of Stress : Not on file   Social Connections:     Frequency of Communication with Friends and Family: Not on file    Frequency of Social Gatherings with Friends and Family: Not on file    Attends Worship Services: Not on file    Active Member of [address] [place] [address] or Organizations: Not on file    Attends Club or Organization Meetings: Not on file    Marital Status: Not on file   Intimate Partner Violence:     Fear of Current or Ex-Partner: Not on file    Emotionally Abused: Not on file    Physically Abused: Not on file    Sexually Abused: Not on file   Housing Stability:     Unable to Pay for Housing in the Last Year: Not on file    Number of [place] in the Last Year: Not on file    Unstable Housing in the Last Year: Not on file       Current Outpatient Medications   Medication Sig Dispense Refill    phentermine (ADIPEX-P) 37.5 MG tablet Take 1 tablet by mouth every morning (before breakfast) for 30 days.  BMI 44. 30 tablet 0    ondansetron (ZOFRAN) 4 MG tablet Take 1 tablet by mouth every 8 hours as needed for Nausea or Vomiting 6 tablet 0    Magic Mouthwash (MIRACLE MOUTHWASH) Swish and spit 15 mLs as needed for Irritation (Patient not taking: Reported on 4/7/2022)      levothyroxine (SYNTHROID) 150 MCG tablet Take 150 mcg by mouth Daily       No current facility-administered medications for this visit. No Known Allergies    Review of Systems:         Review of Systems   Constitutional: Negative. HENT: Negative. Eyes: Negative. Respiratory: Negative. Cardiovascular: Negative. Gastrointestinal: Negative. Endocrine: Negative. Genitourinary: Negative. Musculoskeletal: Negative. Skin: Negative. Allergic/Immunologic: Negative. Neurological: Negative. Hematological: Negative. Psychiatric/Behavioral: Negative. OBJECTIVE:  Physical Exam:    /80   Pulse 80   Ht 5' 6.5\" (1.689 m)   Wt 217 lb (98.4 kg)   SpO2 100%   BMI 34.50 kg/m²      Physical Exam  Constitutional:       Appearance: Normal appearance. HENT:      Head: Normocephalic. Nose: Nose normal.      Mouth/Throat:      Pharynx: Oropharynx is clear. Eyes:      Conjunctiva/sclera: Conjunctivae normal.      Pupils: Pupils are equal, round, and reactive to light. Cardiovascular:      Rate and Rhythm: Normal rate. Pulses: Normal pulses. Pulmonary:      Effort: Pulmonary effort is normal.      Breath sounds: Normal breath sounds. Abdominal:      General: Bowel sounds are normal.   Musculoskeletal:         General: Normal range of motion. Cervical back: Normal range of motion. Skin:     General: Skin is warm and dry. Capillary Refill: Capillary refill takes less than 2 seconds. Neurological:      General: No focal deficit present. Mental Status: She is alert and oriented to person, place, and time. Psychiatric:         Mood and Affect: Mood normal.         Behavior: Behavior normal.           ASSESSMENT:  1. Obesity (BMI 30-39.9)  - Continue tracking calories; about  7551-8745 daily  - 64 oz water daily  - Increase activity as able    2.  Medication management  - Doing well; down 13.6 pounds since last visit  - Denies any side effects  - Rx refill sent      PLAN:    -Continue Adipex. RX REFILLED.    -BMI is over 30, or over 27 with weight-related risk factors.     -Pt demonstrated weight loss since last visit.    -Pt aware Adipex can only be used short term (3 months). -Pt aware no breaks in treatment allowed or must be off for 6 months.    -Pt aware that weight must be lost at each visit or medication will be discontinued.     -Pt is aware that in order to be successful, must combine Adipex with appropriate diet and exercise plan. -Pt aware must continue to meet monthly in person while on this medication. F/u in one month. -Check [place] Automated Rx Reporting System. Any concerns: NONE Reported     - Current birth control measures include: Hysterectomy    - If elderly or renal impairment, will use lower dose (15 mg daily) and avoid all together if GFR is less than 15. N/A      No orders of the defined types were placed in this encounter. Orders Placed This Encounter   Medications    phentermine (ADIPEX-P) 37.5 MG tablet     Sig: Take 1 tablet by mouth every morning (before breakfast) for 30 days. BMI 44. Dispense:  30 tablet     Refill:  0        Follow Up:  Return in about 1 month (around 6/23/2022).       ERIC Melissa - CNP

## 2022-06-21 ENCOUNTER — OFFICE VISIT (OUTPATIENT)
Dept: BARIATRICS/WEIGHT MGMT | Age: 37
End: 2022-06-21
Payer: COMMERCIAL

## 2022-06-21 VITALS
HEART RATE: 64 BPM | WEIGHT: 215.5 LBS | OXYGEN SATURATION: 100 % | BODY MASS INDEX: 33.82 KG/M2 | DIASTOLIC BLOOD PRESSURE: 86 MMHG | HEIGHT: 67 IN | SYSTOLIC BLOOD PRESSURE: 120 MMHG

## 2022-06-21 DIAGNOSIS — R63.5 WEIGHT GAIN: Primary | ICD-10-CM

## 2022-06-21 PROCEDURE — 99213 OFFICE O/P EST LOW 20 MIN: CPT | Performed by: NURSE PRACTITIONER

## 2022-06-21 RX ORDER — PHENTERMINE HYDROCHLORIDE 37.5 MG/1
37.5 TABLET ORAL
Qty: 30 TABLET | Refills: 0 | Status: SHIPPED | OUTPATIENT
Start: 2022-06-21 | End: 2022-07-21

## 2022-06-21 ASSESSMENT — ENCOUNTER SYMPTOMS
SORE THROAT: 0
APNEA: 0
WHEEZING: 0
DIARRHEA: 0
SHORTNESS OF BREATH: 0
PHOTOPHOBIA: 0
NAUSEA: 0
BACK PAIN: 0
CHEST TIGHTNESS: 0
COLOR CHANGE: 0

## 2022-06-21 NOTE — PROGRESS NOTES
Chief Complaint   Patient presents with    Weight Management     adipex #3  med wm          SUBJECTIVE:    HPI: Patient is here with complaints of: monthly Adipex visit. Obesity with a BMI of Body mass index is 34.26 kg/m². .    Current weight: 215.5    Weight change since last visit: -1.5 (if pt failed to lose weight, cannot remain on medication). Month 2 of 3 of being on Adipex. Any new absolute contraindications (glaucoma, drug abuse, CAD, uncontrolled HTN, arrhythmias, stroke, CHF, hyperthyroidism, MAOI use, pregnant or breastfeeding) to being on medication: Denies    Pt complains of the following side effects: Denies    Current dietary measures: tracking calories. 9810-4985    Current exercise measures: Not exercising. I have reviewed the patient's(pertinent information to this visit) medical history, family history(scanned in  the [address] [place] [address] under \"patient questioner\"), social history and review of systems with the patient today in the office.           Past Surgical History:   Procedure Laterality Date    APPENDECTOMY      @ age 22years old   Ana Solum BUNIONECTOMY Right     great toe    [place] LAP,CHOLECYSTECTOMY N/A 6/20/2018    ROBOTIC CHOLECYSTECTOMY, POSSIBLE CHOLANGIOGRAM, POSSIBLE OPEN performed by Fidel Conway MD at [facility]       Past Medical History:   Diagnosis Date    Complication of anesthesia     states low blood pressure sfter surgery    Infertility, female     Rh incompatibility     Thyroid disease     hypothroidism       Family History   Problem Relation Age of Onset    Asthma Brother     Cancer Paternal Grandmother         lung mets to brain    Stroke Paternal Grandfather        Social History     Socioeconomic History    Marital status:      Spouse name: Not on file    Number of children: Not on file    Years of education: Not on file    Highest education level: Not on file   Occupational History    Not on file   Tobacco Use    Smoking status: Former Smoker     Quit date: 2014     Years since quittin.5    Smokeless tobacco: Never Used   Substance and Sexual Activity    Alcohol use: No    Drug use: No    Sexual activity: Yes     Partners: Male   Other Topics Concern    Not on file   Social History Narrative    Not on file     Social Determinants of Health     Financial Resource Strain:     Difficulty of Paying Living Expenses: Not on file   Food Insecurity:     Worried About Running Out of Food in the Last Year: Not on file    Elizabeth of Food in the Last Year: Not on file   Transportation Needs:     Lack of Transportation (Medical): Not on file    Lack of Transportation (Non-Medical): Not on file   Physical Activity:     Days of Exercise per Week: Not on file    Minutes of Exercise per Session: Not on file   Stress:     Feeling of Stress : Not on file   Social Connections:     Frequency of Communication with Friends and Family: Not on file    Frequency of Social Gatherings with Friends and Family: Not on file    Attends Yazidism Services: Not on file    Active Member of [address] [place] [address] or Organizations: Not on file    Attends Club or Organization Meetings: Not on file    Marital Status: Not on file   Intimate Partner Violence:     Fear of Current or Ex-Partner: Not on file    Emotionally Abused: Not on file    Physically Abused: Not on file    Sexually Abused: Not on file   Housing Stability:     Unable to Pay for Housing in the Last Year: Not on file    Number of [place] in the Last Year: Not on file    Unstable Housing in the Last Year: Not on file       Current Outpatient Medications   Medication Sig Dispense Refill    phentermine (ADIPEX-P) 37.5 MG tablet Take 1 tablet by mouth every morning (before breakfast) for 30 days. BMI 44. 30 tablet 0    phentermine (ADIPEX-P) 37.5 MG tablet Take 1 tablet by mouth every morning (before breakfast) for 30 days.  BMI 44. 30 tablet 0    ondansetron (ZOFRAN) 4 MG tablet Take 1 tablet by mouth every 8 reactive to light. Cardiovascular:      Rate and Rhythm: Normal rate and regular rhythm. Pulses: Normal pulses. Heart sounds: Normal heart sounds. Pulmonary:      Effort: Pulmonary effort is normal.      Breath sounds: Normal breath sounds. Abdominal:      General: Bowel sounds are normal.   Musculoskeletal:         General: Normal range of motion. Cervical back: Normal range of motion and neck supple. Skin:     General: Skin is warm and dry. Neurological:      General: No focal deficit present. Mental Status: She is alert and oriented to person, place, and time. Mental status is at baseline. Psychiatric:         Mood and Affect: Mood normal.         Behavior: Behavior normal.       ASSESSMENT:  1. Weight gain          PLAN:  Treatment:      -Continue Adipex. Refilled. BMI is over 30, or over 27 with weight-related risk factors. Pt demonstrated weight loss since last visit.    -Pt aware Adipex can only be used short term (3 months). -Pt aware no breaks in treatment allowed or must be off for 6 months.    -Pt aware that weight must be lost at each visit or medication will be discontinued.     -Pt is aware that in order to be successful, must combine Adipex with appropriate diet and exercise plan. -Pt aware must continue to meet monthly in person while on this medication. F/u in one month. No orders of the defined types were placed in this encounter. Orders Placed This Encounter   Medications    phentermine (ADIPEX-P) 37.5 MG tablet     Sig: Take 1 tablet by mouth every morning (before breakfast) for 30 days. BMI 44. Dispense:  30 tablet     Refill:  0        Follow Up:  Return in about 1 month (around 7/21/2022) for weight check.       ERIC Real - CNP

## 2023-01-03 ENCOUNTER — OFFICE VISIT (OUTPATIENT)
Dept: BARIATRICS/WEIGHT MGMT | Age: 38
End: 2023-01-03
Payer: COMMERCIAL

## 2023-01-03 VITALS
HEART RATE: 72 BPM | DIASTOLIC BLOOD PRESSURE: 76 MMHG | HEIGHT: 67 IN | WEIGHT: 233.4 LBS | SYSTOLIC BLOOD PRESSURE: 122 MMHG | BODY MASS INDEX: 36.63 KG/M2 | TEMPERATURE: 98.1 F

## 2023-01-03 DIAGNOSIS — E28.2 PCOS (POLYCYSTIC OVARIAN SYNDROME): ICD-10-CM

## 2023-01-03 DIAGNOSIS — E03.9 HYPOTHYROIDISM, UNSPECIFIED TYPE: ICD-10-CM

## 2023-01-03 DIAGNOSIS — N97.0 INFERTILITY ASSOCIATED WITH ANOVULATION: ICD-10-CM

## 2023-01-03 PROCEDURE — 99203 OFFICE O/P NEW LOW 30 MIN: CPT | Performed by: PHYSICIAN ASSISTANT

## 2023-01-03 NOTE — PROGRESS NOTES
[address] [facility]  [address], [address]  [facility] [address]  Dept: [phone]  Dept Fax: [phone]  Loc: [phone]         Visit Date:  1/3/2023  Weight Management Initial Evaluation Note    HPI:      The patient is a 42 y.o. female being seen for initial evaluation in the weight management center. She attended the informational seminar and is interested in medical management of weight loss. The patient's PCP is Shelbi Bobo MD.  The patient first recognized that she had excess weight for the last 7 years and has struggled with her weight since. Her highest adult weight has been 233 lbs and lowest adult weight was 182 lbs. Current weight is 233. Current BMI is Body mass index is 36.56 kg/m². . The patient reports that her obesity is significantly affecting her life on a daily basis. Weight Loss History:   She has tried below methods to try to lose weight . These attempts have been somewhat successful with weight loss, but have failed to sustain adequate weight loss. The patient has also tried self directed diet and exercise in attempts to sustain weight loss. Specific programs tried include:    [x] Meal replacement program: Carlos En[facility] Bell    [x] Medications/ supplements:Phentermine    [] Behavior modification:    [] Alternative health:    [] Work with a Dietitian:    [] :    [] Commercial weight loss:    [] NextNine Watchers    [] Jeanne Patel    [] Nutrisystem    [] Low Calorie diets: This patient has not had previous bariatric surgery    I have reviewed the 24 hour diet recall with Jenny Elliott. Additionally, she rates her readiness to change 10. We also reviewed barriers to change and her personal statement.   Motivators include: to feel better about myself, to be more active with my children, to improve my health, to increase my energy, and to allow me to live a more active and social lifestyle      STOP BANG Questionnaire:2          Weight Related Comorbid Conditions:   Significant weight related diseases affecting this patient are Hypothyroidism, PCOS and infertility    Allergies:  No Known Allergies    Past Medical History:     Past Medical History:   Diagnosis Date    Complication of anesthesia     states low blood pressure sfter surgery    Infertility, female     Rh incompatibility     Thyroid disease     hypothroidism   .     Past Surgical History:  Past Surgical History:   Procedure Laterality Date    APPENDECTOMY      @ age 22years old    BUNIONECTOMY Right     great toe    [place] LAPAROSCOPY SURG CHOLECYSTECTOMY N/A 2018    ROBOTIC CHOLECYSTECTOMY, POSSIBLE CHOLANGIOGRAM, POSSIBLE OPEN performed by Rajan Wong MD at [address]  History:  Family History   Problem Relation Age of Onset    Arthritis Father     Asthma Brother     Arthritis Paternal Grandmother     Cancer Paternal Grandmother         lung mets to brain    Stroke Paternal Grandfather        Social History:  Social History     Socioeconomic History    Marital status:      Spouse name: Not on file    Number of children: Not on file    Years of education: Not on file    Highest education level: Not on file   Occupational History    Not on file   Tobacco Use    Smoking status: Former     Types: Cigarettes     Quit date: 2014     Years since quittin.0    Smokeless tobacco: Never   Substance and Sexual Activity    Alcohol use: No    Drug use: No    Sexual activity: Yes     Partners: Male   Other Topics Concern    Not on file   Social History Narrative    Not on file     Social Determinants of Health     Financial Resource Strain: Not on file   Food Insecurity: Not on file   Transportation Needs: Not on file   Physical Activity: Not on file   Stress: Not on file   Social Connections: Not on file   Intimate Partner Violence: Not on file   Housing Stability: Not on file       Current Medications:  Outpatient Medications Marked as Taking for the 1/3/23 encounter (Office Visit) with JADE Albert   Medication Sig Dispense Refill    levothyroxine (SYNTHROID) 175 MCG tablet Take 175 mcg by mouth Daily           24 hour diet recall and physical activity reviewed. Review of Systems:   Constitutional: Denies any fever, chills, fatigue. Wound: Denies any rash, skin color changes or wound problems. Resp: Denies any cough, shortness of breath. CV: Denies any chest pain, orthopnea or syncope. Endocrine: Denies heat intolerance, cold intolerance,polydipsia, polyuria  MS: Denies any myalgias, arthralgias  GI: Denies any nausea, vomiting, diarrhea, constipation. : Denies any hematuria, hesitancy or dysuria. Neuro: Denies dizziness, syncope, headaches. Objective:    /76 (Site: Right Upper Arm, Position: Sitting, Cuff Size: Large Adult)   Pulse 72   Temp 98.1 °F (36.7 °C) (Oral)   Ht 5' 7\" (1.702 m)   Wt 233 lb 6.4 oz (105.9 kg)   BMI 36.56 kg/m²   Body mass index is 36.56 kg/m². Physical Exam:  CONSTITUTIONAL: alert, cooperative, no apparent distress. Alert and oriented x 3. SKIN:  No visible rashes or lesions. HEENT: Head is normocephalic, atraumatic. EOMI b/l  NECK: Supple  CHEST/LUNGS: respirations unlabored. CARDIOVASCULAR:Regular rate and rhythm. NEUROLOGIC: There are no focalizing motor or sensory deficits. CN II-XII are grossly intact. Pinky Angles EXTREMITIES: no cyanosis, no clubbing and no edema. Assessment:       Diagnosis Orders   1. BMI 36.0-36.9,adult        2. PCOS (polycystic ovarian syndrome)        3. Infertility associated with anovulation        4. Hypothyroidism, unspecified type            Plan:    Management of obesity through monitored weight loss. She is not interested in surgical options for weight loss at this time.  She  Would like to proceed with supervised weight loss through our Weight Management program.     Kim Elliott will be referred to our dietitian for evaluation and treatment. At that visit, goals and nutrition plan will be instituted, including vitamin supplementation. Additionally, this patient will begin a monitored exercise program with evaluation by our exercise physiologist, as discussed. She was given a copy of our nutrition classes and support group schedule and is encouraged to attend. Discussed FDA approved weight loss medications and will consider utilizing after maximum weight loss with diet and exercise. Discussed the [facility] program and will consider starting at next appointment. I spent a total of 40 minutes with Jenny Elliott today with greater than 50% of that time spent in face to face counseling and coordination of care regarding the issues covered in this note. Return in about 1 month (around 2/3/2023) for Follow up. I appreciate the opportunity to be involved in this patient's care.     Electronically signed by JADE Flanagan on 1/3/2023 at 4:26 PM

## 2023-01-13 NOTE — PROGRESS NOTES
Cee Elliott is a 42 y.o. female with a date of birth of 1985. Patient is a 42 y.o. female seen for initial MNT visit for medically supervised weight loss. Vitals from current and previous visits:  Vitals 5/3/4945   SYSTOLIC 192   DIASTOLIC 76   Site Right Upper Arm   Position Sitting   Cuff Size Large Adult   Pulse 72   Temp 98.1   Resp    SpO2    Weight 233 lb 6.4 oz   Height 5' 7\"   Body mass index 36.55 kg/m2   Waist (Inches) 40.5   Neck circumference (Inches) 14     Vitals 3/74/2057   SYSTOLIC    DIASTOLIC    Site    Position    Cuff Size    Pulse    Temp    Resp    SpO2    Weight 232 lb 12.8 oz   Height 5' 7\"   Body mass index 36.46 kg/m2   Waist (Inches)    Neck circumference (Inches)           Vitals:    01/16/23 1456   Weight: 232 lb 12.8 oz (105.6 kg)   Height: 5' 7\" (1.702 m)    BMI: Body mass index is 36.46 kg/m². Obesity Classification: Class II    Weight History: Wt Readings from Last 3 Encounters:   01/16/23 232 lb 12.8 oz (105.6 kg)   01/03/23 233 lb 6.4 oz (105.9 kg)   06/21/22 215 lb 8 oz (97.8 kg)       Family doctor adjusted thyroid medication recently with elevated TSH level    Describe your current weight: \"I am a yo-yo dieter\" \"Have not been doing things the right way\". Today states desires to learn more about nutrition, recommended calories and macronutrients. Patient's lowest adult weight was 160-165 lb at age 23. Age 32 and weight was 197 lbs when got . 1st pregnancy weight was 212 lbs and gained 80 lbs. Had second child ~ 2 years later and after last pregnancy reports a weight of ~ 250 lbs    Got weight down to ~ 180 lbs per pt 2 1/2 years ago- Jonna Dural- states kept the weight off  ( had been 250 lbs)  Hysterectomy June 2021 and states last 18 months has gained weight back        Patient has participated in the following weight loss programs: [employer] 2019- lost 70 lbs.   Has done weight watchers, slim fast    Patient has participated in meal replacement/liquid diets. Patient has participated in weight loss medications. - Adipex April-June 2022 at [facility] ( went from 230 lbs to 215 lbs)    Patient is not lactose intolerant. Patient does not have Baptist/cultural food concerns. Patient does not have food allergies. Patient dines out to a sit down restaurant 1 time per week or greater. Patient has fast food or picks up carry out  3-4 times per week or greater      Grocery Shopping in household done by: spouse  Cooking in household done by: spouse  Receives food voucher for work for all three meals. Discussed looking into if able to use voucher to purchase groceries at a store in place of restuarant  Work and Sleep Schedule: Works full time. 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 Flourish - Herbal Life Protein Shake and tea every morning  Snack: no.   Lunch: yes. May have lunch two days a week at restaurant or carry into facility. Otherwise will get self another protein shake  Snack: yes. Cashews, almonds from a gas station  Dinner: yes. Tacos, hamburger helper  If working will stop on way home. [employer] or [employer]- sandwich  Hamburger and fries  Snack: no.  Doesn't snack in evening  Drinks throughout the day: Teas with caffeine once a day, Ice, Bubly, Bennett- two cans/day. 75oz water just plain every day. Occasional coffee  Do you drink alcohol? Social once a month. Patient does not meet the criteria for binge eating disorder. Patient does not have grazing. Patient does not have night eating. Patient does have a history of emotional eating or eating out of boredom. Patient describes self as fast eater    Patient describes level of activity as sedentary.     Has tendonitis. In boot for next eight weeks and MRI from there [facility]  Had begun exercising in October. Got some 10 lb weights for home. Patient can view online Fitness Orientation Video prior to any usage of [place]- emailed video to patient with goal of getting home exercises to do    Assessment  Nutritional Needs: [place] = 1769 kcal x 1.2 (activity factor)= 2123 kcal - 500- 1000 (for 1-2 lb weight loss/week)= 8677-5764 kcals per day  Food recall reveals increased fast food intake with traveling to work. Pt is open to changes in meal planning including packing foods for car to assist with weight loss    Nutrition Diagnosis: Overweight/Obesity related to Lack of previous MNT as evidenced by  Body mass index is 36.46 kg/m². [employer] Plan  Plan/Recommendations:  Reviewed food labels with patient to identify carbohydrate servings and portion sizes. Pt with good understanding of label reading  Education provided on ~ 1500 calorie individualized meal plan with 3 meals and 0-1 snacks daily. Suggested 45 % calories from carbohydrates, 25 % calories from protein and 30 % calories from fat to assist with a balanced meal plan and encourage variety. Handouts given:   1. Individual Meal Plan  2. Food Journal  3. Sample menus  4   Protein/Produce Snack handout    Goals  Weight: Initial Weight Loss goal of ~ 5-10% over next three months reviewed and agreed upon with patient      Health Improvement: Patient to work towards a long term healthy eating pattern to assist with weight loss. Long Term goal of ~ 160-165 lbs    -  Patient Instructions   Goals:  Nutrition Goal:  I will use my food journal to record meal times, serving sizes and bring back to next dietitian visit  Water Goal: I will continue at least 64 oz of water or greater a day  Exercise Goal:  Make appointment with [facility] Coordinator to review exercise ideas.      -Followup visit: 4 weeks with dietitian.          Holly Nino RD, MIKEL,  Dietitian- Weight Management [address]

## 2023-01-16 ENCOUNTER — OFFICE VISIT (OUTPATIENT)
Dept: BARIATRICS/WEIGHT MGMT | Age: 38
End: 2023-01-16

## 2023-01-16 VITALS — WEIGHT: 232.8 LBS | BODY MASS INDEX: 36.54 KG/M2 | HEIGHT: 67 IN

## 2023-01-16 NOTE — PATIENT INSTRUCTIONS
Goals:  Nutrition Goal:  I will use my food journal to record meal times, serving sizes and bring back to next dietitian visit  Water Goal: I will continue at least 64 oz of water or greater a day  Exercise Goal:  Make appointment with [facility] Coordinator to review exercise ideas.

## 2023-02-09 NOTE — PROGRESS NOTES
Assessment: Patient is a 42 y.o. female seen for   month one  follow up MNT visit for  medically supervised weight loss    Vitals from current and previous visits:  Vitals 4/38/5015   SYSTOLIC    DIASTOLIC    Site    Position    Cuff Size    Pulse    Temp    Resp    SpO2    Weight 231 lb 12.8 oz   Height 5' 7\"   Body mass index 36.3 kg/m2   Waist (Inches)    Neck circumference (Inches)        Initial weight at start of Weight Management Program was: 233 lbs     Bang Ellis lost 2 lbs from RD visit  -Weight goal: lose weight. Pt voiced today not surprised with small weight loss due to low exercise, and had a small vacation when eating what wanted    -Nutritionally relevant labs:   Lab Results   Component Value Date/Time    LABA1C 4.9 02/01/2022 08:07 AM    GLUCOSE 100 (H) 01/20/2022 08:05 AM    GLUCOSE 81 01/21/2020 04:05 PM    GLUCOSE Negative 01/21/2020 04:05 PM    CHOL 154 01/20/2022 08:05 AM    HDL 35 (L) 01/20/2022 08:05 AM    LDLCALC 91 01/20/2022 08:05 AM    TRIG 140 01/20/2022 08:05 AM       - Is patient taking daily Multivitamin:  none    Pt has been using her meal vouchers from work for groceries    Work and Sleep Schedule: Works full time. Pt travels daily for work. Only going to be on the road 1x a week starting next month. -Food Recall: using fit bit saqib for meal tracking, calories set at 1600,   Breakfast: 7am turkey sausage egg white sandwitch. Snakc string cheese  Lunch: salad kit. Dinner: salad kit, pork loin meat and vegtable. Snack: no.  Doesn't snack in evening  Pt reconzied she is consuming not a lot of fruit. Pt. has been trying to limit her fast food intake. Pt. Recognize that she needs to consume more protein and has actively been trying to increase her intake. Drinks throughout the day: herbal life tea, Bennett- two-three cans/day.   75oz water just plain 2x a day sometimes flavoring    -Impression of Dietary Intake: - Pt  is working towards avoiding high fat/high sugar foods by using meal vouchers in the grocery store. Pt  is working towards  including protein at meals and snacks. Exercise:  -Physical Activity is:  Make appointment with [facility] Coordinator to review exercise ideas. Frequency of Activity: is currently on boot is limiting excersise until clearance encouraged pt to do upper body exercise. Nutrition Diagnosis: Overweight/Obesity related to Food and nutrition-related knowledge deficit as evidenced by traveling a lot for work and having to use meal vouchers, protein knowledge deficit. Intervention:  Patient has attended support group  Questioned answered goals reviewed with pt. -  Patient Instructions   Goals:  Nutrition Goal: Continue to track your food choices in Fit Bit and aim for ~ 80-90 grams protein per day  When looking at protein powder make sure it has ~ 20-30 grams protein , less than six grams sugar and less than three grams fat per scoop  Water Goal: I will continue at least 64 oz of water or greater a day  Exercise Goal:  Make appointment with [facility] Coordinator to review exercise ideas. Can add fresh fruit with breakfast or mid morning snack to current snack (eg. Little cuties, apples, grapes, pears)         -Followup visit: 4 weeks with dietitian. Melonie Romano RD, LD,   Dietitian- [facility]  [facility]  Note was created by [facility], dietetic intern.   Note reviewed by Marylene Lawless RD, LD,

## 2023-02-10 ENCOUNTER — OFFICE VISIT (OUTPATIENT)
Dept: BARIATRICS/WEIGHT MGMT | Age: 38
End: 2023-02-10

## 2023-02-10 VITALS — BODY MASS INDEX: 36.38 KG/M2 | HEIGHT: 67 IN | WEIGHT: 231.8 LBS

## 2023-02-10 NOTE — PATIENT INSTRUCTIONS
Goals:  Nutrition Goal: Continue to track your food choices in Fit Bit and aim for ~ 80-90 grams protein per day  When looking at protein powder make sure it has ~ 20-30 grams protein , less than six grams sugar and less than three grams fat per scoop  Water Goal: I will continue at least 64 oz of water or greater a day  Exercise Goal:  Make appointment with [employer] Coordinator to review exercise ideas.   Can add fresh fruit with breakfast or mid morning snack to current snack (eg. Little cuties, apples, grapes, pears)

## 2023-02-16 ENCOUNTER — OFFICE VISIT (OUTPATIENT)
Dept: BARIATRICS/WEIGHT MGMT | Age: 38
End: 2023-02-16

## 2023-02-16 VITALS
DIASTOLIC BLOOD PRESSURE: 74 MMHG | SYSTOLIC BLOOD PRESSURE: 110 MMHG | BODY MASS INDEX: 36.63 KG/M2 | HEIGHT: 67 IN | TEMPERATURE: 97.3 F | HEART RATE: 68 BPM | WEIGHT: 233.4 LBS

## 2023-02-16 DIAGNOSIS — E28.2 PCOS (POLYCYSTIC OVARIAN SYNDROME): ICD-10-CM

## 2023-02-16 DIAGNOSIS — E03.9 HYPOTHYROIDISM, UNSPECIFIED TYPE: ICD-10-CM

## 2023-02-16 DIAGNOSIS — N97.0 INFERTILITY ASSOCIATED WITH ANOVULATION: ICD-10-CM

## 2023-02-16 RX ORDER — LEVOTHYROXINE SODIUM 0.2 MG/1
TABLET ORAL
COMMUNITY
Start: 2023-01-06

## 2023-02-16 NOTE — Clinical Note
[address] [facility]  [address], [address]  [facility] [address]  Phone: [phone]  Fax: [place], [address]        February 16, 2023     Patient: Marjorie Elliott   YOB: 1985   Date of Visit: 2/16/2023       To Whom It May Concern: It is my medical opinion that [facility] Poor {Work release (duty restriction):30183}. If you have any questions or concerns, please don't hesitate to call.     Sincerely,        JADE Sosa

## 2023-02-16 NOTE — PATIENT INSTRUCTIONS
Behavior modification discussed in detail in regards to dietary habits. Nutritional education occurred during visit. Continue following recommendations  per dietitian. Improvement in fitness/exercise discussed with patient and the need for this  with/without surgery. Schedule orientation with Dario Taylor, Exercise Physiologist, at the fitness  center. Encouraged to attend support groups. Goal to lose 1-2# per week  Seca scale completed and reviewed. Contrave samples #21 given today. Discussed titration scheduled in detail. Information given. Discussed risks, benefits and possible effects. Unsure if covered by insurance. Will try sending to local pharmacy if not covered-patient will call office and send to [facility].

## 2023-02-16 NOTE — PROGRESS NOTES
[address] [facility]  [address]  SANRIYA LEWISHARJITMEKA KIEL JOEPACHECO LOW.MILENA, [address] [facility]  [phone]      Visit Date:  2/16/2023  Weight Management     HPI:    Medically Supervised follow-up- Month #1 of 3- medical supervision    Olinda Elliott is here today for continued supervised weight management of morbid obesity. Weight today 233#. Weight stable since last appointment. BMI 36. She reports that she is working on the behavior changes discussed at her initial appointment. Trying to follow dietitian recommendations. Typically around 1600 calories daily- goal was 1500. \"But I always feel hungry. \" \"I always want to know what I can eat next. \"  Reports that her appetite is not controlled. Craving food, but doing her best to stick to dietitian recommendations. Admits that she has been an emotional eater int he past. Interested in trying weight loss medication. Discussed options in detail with patient. Long discussion on importance of lifestyle changes, making better decisions with nutrition and increasing dedicated activity to be successful lifelong with weight loss with or without bariatric surgery. Planning to get started in Yoga classes next week, with a goal of twice per week. Comorbid conditions include PCOS, infertility, hypothyroid. Seca scale completed and reviewed. Physical Activity:  starting yoga next week-twice per week. Current BMI: Body mass index is 36.56 kg/m².   Current Weight:   Wt Readings from Last 3 Encounters:   02/16/23 233 lb 6.4 oz (105.9 kg)   02/10/23 231 lb 12.8 oz (105.1 kg)   01/16/23 232 lb 12.8 oz (105.6 kg)     Initial Body GLXINU:288    Past Medical History:  Past Medical History:   Diagnosis Date    Complication of anesthesia     states low blood pressure sfter surgery    Infertility, female     Rh incompatibility     Thyroid disease     hypothroidism       Past Surgical History:  Past Surgical History:   Procedure Laterality Date    APPENDECTOMY      @ age 22 years old    BUNIONECTOMY Right     great toe    VA LAPAROSCOPY SURG CHOLECYSTECTOMY N/A 2018    ROBOTIC CHOLECYSTECTOMY, POSSIBLE CHOLANGIOGRAM, POSSIBLE OPEN performed by Sathish Mcneill MD at [facility]       Past Social History:  Social History     Socioeconomic History    Marital status:      Spouse name: Not on file    Number of children: Not on file    Years of education: Not on file    Highest education level: Not on file   Occupational History    Not on file   Tobacco Use    Smoking status: Former     Types: Cigarettes     Quit date: 2014     Years since quittin.2    Smokeless tobacco: Never   Substance and Sexual Activity    Alcohol use: No    Drug use: No    Sexual activity: Yes     Partners: Male   Other Topics Concern    Not on file   Social History Narrative    Not on file     Social Determinants of Health     Financial Resource Strain: Not on file   Food Insecurity: Not on file   Transportation Needs: Not on file   Physical Activity: Not on file   Stress: Not on file   Social Connections: Not on file   Intimate Partner Violence: Not on file   Housing Stability: Not on file        Medications:   Current Outpatient Medications   Medication Sig Dispense Refill    levothyroxine (SYNTHROID) 200 MCG tablet TAKE 1 TABLET BY MOUTH ONCE DAILY      naltrexone-buPROPion (CONTRAVE) 8-90 MG per extended release tablet Take 2 tablets by mouth 2 times daily 120 tablet 0     No current facility-administered medications for this visit. Allergies:   No Known Allergies    Subjective:    Review of Systems:  Constitutional: Denies any fever, chills, fatigue. Wound: Denies any rash, skin color changes or wound problems. Resp: Denies any cough, shortness of breath. CV: Denies any chest pain, orthopnea or syncope. MS: Denies myalgias, arthralgias. GI: Denies any nausea, vomiting, diarrhea, constipation, melena, hematochezia. : Denies any hematuria, hesitancy or dysuria.    NEURO: Denies seizures, headache. Objective:    /74 (Site: Right Upper Arm, Position: Sitting, Cuff Size: Large Adult)   Pulse 68   Temp 97.3 °F (36.3 °C) (Infrared)   Ht 5' 7\" (1.702 m)   Wt 233 lb 6.4 oz (105.9 kg)   BMI 36.56 kg/m²   Constitutional: Alert and oriented to person, place and time. Well-developed, well- nourished. Head: Normocephalic and atraumatic  Neck: Supple. Eyes: EOMI b/l. Conjunctivae normal.  No scleral icterus. Respiratory: Effort normal. No respiratory distress. Abdomen: Obese  Ext:  Movement x 4. No edema  Skin; Warm and dry, no visible rashes, lesions or ulcers.    Neuro: Cranial Nerves Grossly Intact; nml coordination    CBC   Lab Results   Component Value Date/Time    WBC 5.6 01/20/2022 08:05 AM    WBC 7.8 02/12/2020 09:57 AM    RBC 4.74 01/20/2022 08:05 AM    HGB 14.7 01/20/2022 08:05 AM    HCT 43.0 01/20/2022 08:05 AM    MCV 91 01/20/2022 08:05 AM    MCH 31.1 01/20/2022 08:05 AM    MCHC 34.2 01/20/2022 08:05 AM    RDW 13.0 01/20/2022 08:05 AM     01/20/2022 08:05 AM     02/12/2020 09:57 AM    MPV 9.4 01/20/2022 08:05 AM    RBCMORP NORMAL 12/27/2015 08:57 PM    SEGSPCT 69.1 03/13/2018 05:40 PM    LABLYMP 22.4 03/13/2018 05:40 PM    MONOPCT 7.1 01/20/2022 08:05 AM    MONOPCT 6.2 02/12/2020 09:57 AM    LABEOS 2.3 03/13/2018 05:40 PM    BASO 0.8 03/13/2018 05:40 PM    NRBC 0.0 02/12/2020 09:57 AM    NRBC 0 03/13/2018 05:40 PM    ANISOCYTOSIS 1+ 12/15/2017 03:58 AM    SEGSABS 5.8 03/13/2018 05:40 PM    LYMPHSABS 1.9 01/20/2022 08:05 AM    LYMPHSABS 1.9 03/13/2018 05:40 PM    MONOSABS 0.4 01/20/2022 08:05 AM    MONOSABS 0.5 03/13/2018 05:40 PM    EOSABS 0.2 01/20/2022 08:05 AM    EOSABS 0.2 03/13/2018 05:40 PM    BASOSABS 0.1 01/20/2022 08:05 AM    BASOSABS 0.1 03/13/2018 05:40 PM        BMP/CMP   Lab Results   Component Value Date/Time    GLUCOSE 100 01/20/2022 08:05 AM    CREATININE 0.86 01/20/2022 08:05 AM    BUN 14 01/20/2022 08:05 AM     01/20/2022 08:05 AM K 4.3 01/20/2022 08:05 AM     01/20/2022 08:05 AM    CO2 25 01/20/2022 08:05 AM    CALCIUM 9.0 01/20/2022 08:05 AM    AST 18 01/20/2022 08:05 AM    ALKPHOS 55 01/20/2022 08:05 AM    ALKPHOS 83 03/13/2018 05:40 PM    PROT 7.0 01/20/2022 08:05 AM    LABALBU 4.2 01/20/2022 08:05 AM    BILITOT 0.4 01/20/2022 08:05 AM    BILITOT Negative 01/21/2020 04:05 PM    ALT 32 01/20/2022 08:05 AM        PREALBUMIN   No results found for: PREALBUMIN     VITAMIN B12   No results found for: Maria Borne     VITAMIN D   No results found for: VITD25     PTH  No results found for: IPTH    VITAMIN B1/ THIAMINE   No results found for: [id number]     LIPID SCREEN (FASTING)   Lab Results   Component Value Date/Time    CHOL 154 01/20/2022 08:05 AM    TRIG 140 01/20/2022 08:05 AM    HDL 35 01/20/2022 08:05 AM    LDLCALC 91 01/20/2022 08:05 AM   ,     HGA1C (T2DM ONLY)   Lab Results   Component Value Date/Time    LABA1C 4.9 02/01/2022 08:07 AM    AVGG 94 02/01/2022 08:07 AM        TSH   Lab Results   Component Value Date/Time    TSH 4.95 01/05/2023 07:55 AM        IRON   No results found for: IRON     TIBC  No results found for: TIBC    FERRITIN  No results found for: FERRITIN    VITAMIN A  No results found for: RETINOL    NICOTINE  No results found for: NMET    UDS  No results found for: UDP    PSA  No results found for: LABPSA    GFR  Lab Results   Component Value Date/Time    LABGLOM 71 03/13/2018 05:40 PM       DEXA  No results found for this or any previous visit. Assessment:       Diagnosis Orders   1. BMI 36.0-36.9,adult        2. PCOS (polycystic ovarian syndrome)        3. Infertility associated with anovulation        4. Hypothyroidism, unspecified type            Plan:    Behavior modification discussed in detail in regards to dietary habits. Nutritional education occurred during visit. Continue following recommendations  per dietitian.   Improvement in fitness/exercise discussed with patient and the need for this with/without surgery. Schedule orientation with Deneen Calero, Exercise Physiologist, at the fitness  center. Encouraged to attend support groups. Goal to lose 1-2# per week  Seca scale completed and reviewed. Contrave samples #21 given today. Discussed titration scheduled in detail. Information given. Discussed risks, benefits and possible effects. Unsure if covered by insurance. Will try sending to local pharmacy if not covered-patient will call office and send to [facility]. Return in about 6 weeks (around 3/30/2023) for Follow up. I spent over 30 minutes with the patient, with greater that 50% of that time spent on education, counseling and coordination of care.      Electronically signed by JADE Leslie on 2/16/2023 at 10:11 AM

## 2023-03-28 ENCOUNTER — OFFICE VISIT (OUTPATIENT)
Dept: BARIATRICS/WEIGHT MGMT | Age: 38
End: 2023-03-28
Payer: COMMERCIAL

## 2023-03-28 ENCOUNTER — HOSPITAL ENCOUNTER (OUTPATIENT)
Dept: PHYSICAL THERAPY | Age: 38
Setting detail: THERAPIES SERIES
Discharge: HOME | End: 2023-03-28

## 2023-03-28 VITALS
HEIGHT: 67 IN | DIASTOLIC BLOOD PRESSURE: 62 MMHG | HEART RATE: 60 BPM | TEMPERATURE: 97.5 F | WEIGHT: 233.2 LBS | BODY MASS INDEX: 36.6 KG/M2 | SYSTOLIC BLOOD PRESSURE: 98 MMHG

## 2023-03-28 DIAGNOSIS — E03.9 HYPOTHYROIDISM, UNSPECIFIED TYPE: ICD-10-CM

## 2023-03-28 DIAGNOSIS — N97.0 INFERTILITY ASSOCIATED WITH ANOVULATION: ICD-10-CM

## 2023-03-28 DIAGNOSIS — E28.2 PCOS (POLYCYSTIC OVARIAN SYNDROME): ICD-10-CM

## 2023-03-28 PROCEDURE — 99214 OFFICE O/P EST MOD 30 MIN: CPT | Performed by: PHYSICIAN ASSISTANT

## 2023-03-28 RX ORDER — DEXAMETHASONE SODIUM PHOSPHATE 4 MG/ML
INJECTION, SOLUTION INTRA-ARTICULAR; INTRALESIONAL; INTRAMUSCULAR; INTRAVENOUS; SOFT TISSUE
COMMUNITY
Start: 2023-03-21

## 2023-03-28 RX ORDER — NALTREXONE HYDROCHLORIDE AND BUPROPION HYDROCHLORIDE 8; 90 MG/1; MG/1
TABLET, EXTENDED RELEASE ORAL
Qty: 120 TABLET | Refills: 0 | Status: SHIPPED | OUTPATIENT
Start: 2023-03-28

## 2023-03-28 RX ORDER — NALTREXONE HYDROCHLORIDE AND BUPROPION HYDROCHLORIDE 8; 90 MG/1; MG/1
2 TABLET, EXTENDED RELEASE ORAL 2 TIMES DAILY
COMMUNITY
End: 2023-03-28

## 2023-03-28 RX ORDER — LEVOTHYROXINE SODIUM 0.2 MG/1
200 TABLET ORAL DAILY
COMMUNITY

## 2023-03-28 NOTE — PROGRESS NOTES
[facility]  PHYSICAL THERAPY COMMUNICATION NOTE  Felicia Khan PT    Date: 3/28/2023  Patient Name: Emerald Hirsch        MRN: [id number]   : 1985  (42 y.o.)  Gender: female                REASON FOR MISSED TREATMENT:  Patient refused treatment. Marianna Jayacaren was completely blind sided by the estimated cost of today's evaluation. I mentioned that we offer financial assistance, 20% discounts etc, however she was very upset. She left taking her referral with her. Her current treatment has been delayed by a week just to get in to our clinic and it will now be further delayed as she tries to schedule with another clinic in town. I did email Dayanara Patel and Niru regarding this situation.     Fazal Mims \"Letty\Chance Lua, DPT  [id number]

## 2023-03-28 NOTE — PATIENT INSTRUCTIONS
Behavior modification discussed in detail in regards to dietary habits. Nutritional education occurred during visit. Continue following recommendations  per dietitian. Improvement in fitness/exercise discussed with patient and the need for this  with/without surgery. Schedule orientation with Luisa Taylor, Exercise Physiologist, at the fitness  center. Encouraged to attend support groups. Goal to lose 1-2# per week  Seca scale next month  Starting PT today. Encouraged to continue upper body workout. Will continue yoga/short distance walking if able. Continue tracking all food intake- will schedule follow up with the dietitian. Continue Contrave- rx sent.  ( Will need to stop medication a week prior to potential surgery)

## 2023-03-28 NOTE — PROGRESS NOTES
[address] [facility]  [address]  [place] PIZANOPORSCHE LOW.MILENA, [address] [facility]  [phone]      Visit Date:  3/28/2023  Weight Management     HPI:    Medically Supervised follow-up- Month #2 of 3- medical supervision    Olinda Elliott is here today for continued supervised weight management of morbid obesity. Weight today 233#. Weight stable since starting with weight management. BMI 36. She has recently gotten out of her waking boot for her Achilles tendonitis. Started walking again, but that increased pain. Following closely with [facility]. Will be starting PT today with phonophoresis. May need surgery in the next couple months. Has been taking Contrave over the month. \" It calmed down the noise in my head. \"  Stopped the medication for a week as she was discussing surgery. Resumed Contrave about a week ago as now starting PT prior to proceeding with surgery. Currently back on Contrave 2 pills BID. \" I feel better on the medication. \" Medication has helped reduce cravings and appetite. Has been tracking food intake and staying between  1550-6069 calories daily. Averaging 90 grams of protein daily. Has been consistent with drinking a Premier protein shake for breakfast and lunch. Eating small lunch and dinner. Doing better with portion control. Drinking 128oz of water daily. Encouraged to be consistent with routine. Continue tracking. Will schedule a follow up with the dietitian. Will repeat Seca next month. Will follow up with [facility] in 2-3 weeks-if needs to proceed with surgical intervention will need to stop Contrave- pt voiced understanding. Comorbid conditions include PCOS, infertility, hypothyroid. Physical Activity:  none currently. Starting PT today. Current BMI: Body mass index is 36.52 kg/m².   Current Weight:   Wt Readings from Last 3 Encounters:   03/28/23 233 lb 3.2 oz (105.8 kg)   02/16/23 233 lb 6.4 oz (105.9 kg)   02/10/23 231 lb 12.8 oz (105.1 kg)     Initial Body

## 2023-04-19 ENCOUNTER — TELEPHONE (OUTPATIENT)
Dept: BARIATRICS/WEIGHT MGMT | Age: 38
End: 2023-04-19

## 2023-04-19 NOTE — TELEPHONE ENCOUNTER
Called patient after no/showing for appt today with Belem Knott. Asked if patient would like to reschedule her appt. Also we have a refill request from [address] [place] [address] for Contrave. Patient does not have any other appts scheduled with either provider currently. Need to find out if patient it doing well with Contrave and if she wants another refill. Will need to schedule with our providers to continue on Contrave. Left our phone number.

## 2024-04-23 ENCOUNTER — HOSPITAL ENCOUNTER (OUTPATIENT)
Dept: MAMMOGRAPHY | Age: 39
Discharge: HOME | End: 2024-04-23
Attending: FAMILY MEDICINE
Payer: COMMERCIAL

## 2024-04-23 VITALS — WEIGHT: 199 LBS | HEIGHT: 67 IN | BODY MASS INDEX: 31.23 KG/M2

## 2024-04-23 DIAGNOSIS — Z12.31 ENCOUNTER FOR SCREENING MAMMOGRAM FOR MALIGNANT NEOPLASM OF BREAST: ICD-10-CM

## 2024-04-23 PROCEDURE — 77063 BREAST TOMOSYNTHESIS BI: CPT

## 2024-12-30 ENCOUNTER — HOSPITAL ENCOUNTER (OUTPATIENT)
Age: 39
Discharge: HOME | End: 2024-12-30
Payer: COMMERCIAL

## 2024-12-30 ENCOUNTER — HOSPITAL ENCOUNTER (OUTPATIENT)
Dept: GENERAL RADIOLOGY | Age: 39
Discharge: HOME | End: 2024-12-30
Payer: COMMERCIAL

## 2024-12-30 DIAGNOSIS — M75.81 OTHER SHOULDER LESIONS, RIGHT SHOULDER: ICD-10-CM

## 2024-12-30 PROCEDURE — 73030 X-RAY EXAM OF SHOULDER: CPT

## 2025-06-25 ENCOUNTER — HOSPITAL ENCOUNTER (OUTPATIENT)
Dept: MAMMOGRAPHY | Age: 40
Discharge: HOME | End: 2025-06-25
Payer: COMMERCIAL

## 2025-06-25 VITALS — HEIGHT: 67 IN | WEIGHT: 165 LBS | BODY MASS INDEX: 25.9 KG/M2

## 2025-06-25 DIAGNOSIS — Z12.31 VISIT FOR SCREENING MAMMOGRAM: ICD-10-CM

## 2025-06-25 PROCEDURE — 77063 BREAST TOMOSYNTHESIS BI: CPT

## (undated) DEVICE — GLOVE ORANGE PI 8   MSG9080

## (undated) DEVICE — [HIGH FLOW INSUFFLATOR,  DO NOT USE IF PACKAGE IS DAMAGED,  KEEP DRY,  KEEP AWAY FROM SUNLIGHT,  PROTECT FROM HEAT AND RADIOACTIVE SOURCES.]: Brand: PNEUMOSURE

## (undated) DEVICE — GLOVE SURG SZ 6 THK91MIL LTX FREE SYN POLYISOPRENE ANTI

## (undated) DEVICE — CONMED ACCESSORY ELECTRODE, 4 INCH (10.16 CM) FLAT BLADE: Brand: CONMED

## (undated) DEVICE — DRAPE,INSTRUMENT,MAGNETIC,10X16: Brand: MEDLINE

## (undated) DEVICE — ARM DRAPE

## (undated) DEVICE — SPONGE SURG SM 3/8IN WHT PNUT DISECT RADPQ ST

## (undated) DEVICE — SOLUTION IV 1000ML 0.9% SOD CHL PH 5 INJ USP VIAFLX PLAS

## (undated) DEVICE — COLUMN DRAPE

## (undated) DEVICE — 3M™ WARMING BLANKET, UPPER BODY, 10 PER CASE, 42268: Brand: BAIR HUGGER™

## (undated) DEVICE — SOLUTION ANTIFOG VIS SYS CLEARIFY LAPSCP

## (undated) DEVICE — COVER ARMBRD W13XL28.5IN IMPERV BLU FOR OP RM

## (undated) DEVICE — SKIN AFFIX SURG ADHESIVE 72/CS 0.55ML: Brand: MEDLINE

## (undated) DEVICE — BASIC SINGLE BASIN BTC-LF: Brand: MEDLINE INDUSTRIES, INC.

## (undated) DEVICE — COAGULATOR ELECSURG BLADE 10 FTX1 IN PTFE STRL ULTRACLEAN

## (undated) DEVICE — DRAPE C ARM W36XL30IN RECTANG BND BG AND TAPE

## (undated) DEVICE — CORE TRUMPET FOR SINGLE SOLUTION BAG: Brand: CORE DYNAMICS

## (undated) DEVICE — BLADELESS OBTURATOR: Brand: WECK VISTA

## (undated) DEVICE — TIP COVER ACCESSORY

## (undated) DEVICE — GENERAL LAPAROSCOPY PACK-LF: Brand: MEDLINE INDUSTRIES, INC.

## (undated) DEVICE — ELECTRO LUBE IS A SINGLE PATIENT USE DEVICE THAT IS INTENDED TO BE USED ON ELECTROSURGICAL ELECTRODES TO REDUCE STICKING.: Brand: KEY SURGICAL ELECTRO LUBE

## (undated) DEVICE — TROCAR ENDOSCP L100MM DIA5MM BLDELSS STBL SL THRD OPT VW

## (undated) DEVICE — SUREFIT, DUAL DISPERSIVE ELECTRODE, CONTACT QUALITY MONITOR: Brand: SUREFIT

## (undated) DEVICE — REDUCER: Brand: ENDOWRIST

## (undated) DEVICE — CATHETER CHOLGM 4.5FR L18IN TIP 5.5FR W/ MTL SUPP TB TAUT

## (undated) DEVICE — GAUZE,SPONGE,8"X4",12PLY,XRAY,STRL,LF: Brand: MEDLINE

## (undated) DEVICE — INTRODUCER CATH L3.5IN DIA7.5FR FOR CHOLANGIOGRAPHY TAUT

## (undated) DEVICE — Z DUP USE 2641840 CLIP INT L POLYMER LOK LIG HEM O LOK

## (undated) DEVICE — TUBING FLTR PLUME AWAY EVAC W/ SUCT DEV DISP PUREVIEW

## (undated) DEVICE — SEAL